# Patient Record
Sex: FEMALE | Race: BLACK OR AFRICAN AMERICAN | NOT HISPANIC OR LATINO | URBAN - METROPOLITAN AREA
[De-identification: names, ages, dates, MRNs, and addresses within clinical notes are randomized per-mention and may not be internally consistent; named-entity substitution may affect disease eponyms.]

---

## 2017-02-26 ENCOUNTER — EMERGENCY (EMERGENCY)
Facility: HOSPITAL | Age: 37
LOS: 1 days | Discharge: HOME | End: 2017-02-26
Attending: EMERGENCY MEDICINE | Admitting: EMERGENCY MEDICINE
Payer: SELF-PAY

## 2017-02-26 VITALS
SYSTOLIC BLOOD PRESSURE: 125 MMHG | OXYGEN SATURATION: 100 % | TEMPERATURE: 98 F | HEART RATE: 76 BPM | RESPIRATION RATE: 16 BRPM | DIASTOLIC BLOOD PRESSURE: 90 MMHG

## 2017-02-26 VITALS
RESPIRATION RATE: 16 BRPM | OXYGEN SATURATION: 99 % | HEART RATE: 86 BPM | SYSTOLIC BLOOD PRESSURE: 142 MMHG | DIASTOLIC BLOOD PRESSURE: 78 MMHG | TEMPERATURE: 98 F

## 2017-02-26 DIAGNOSIS — Z98.89 OTHER SPECIFIED POSTPROCEDURAL STATES: Chronic | ICD-10-CM

## 2017-02-26 PROCEDURE — 99283 EMERGENCY DEPT VISIT LOW MDM: CPT | Mod: 25

## 2017-02-26 PROCEDURE — 99053 MED SERV 10PM-8AM 24 HR FAC: CPT

## 2017-02-26 PROCEDURE — 64400 NJX AA&/STRD TRIGEMINAL NRV: CPT

## 2017-02-26 RX ORDER — AMOXICILLIN 250 MG/5ML
1 SUSPENSION, RECONSTITUTED, ORAL (ML) ORAL
Qty: 21 | Refills: 0
Start: 2017-02-26 | End: 2017-03-05

## 2017-02-26 RX ORDER — AMOXICILLIN 250 MG/5ML
500 SUSPENSION, RECONSTITUTED, ORAL (ML) ORAL ONCE
Qty: 0 | Refills: 0 | Status: COMPLETED | OUTPATIENT
Start: 2017-02-26 | End: 2017-02-26

## 2017-02-26 RX ORDER — IBUPROFEN 200 MG
1 TABLET ORAL
Qty: 21 | Refills: 0
Start: 2017-02-26 | End: 2017-03-05

## 2017-02-26 RX ORDER — IBUPROFEN 200 MG
600 TABLET ORAL ONCE
Qty: 0 | Refills: 0 | Status: COMPLETED | OUTPATIENT
Start: 2017-02-26 | End: 2017-02-26

## 2017-02-26 RX ORDER — OXYCODONE HYDROCHLORIDE 5 MG/1
1 TABLET ORAL
Qty: 6 | Refills: 0
Start: 2017-02-26 | End: 2017-03-01

## 2017-02-26 RX ORDER — BUPIVACAINE HCL/PF 7.5 MG/ML
2 VIAL (ML) INJECTION ONCE
Qty: 0 | Refills: 0 | Status: COMPLETED | OUTPATIENT
Start: 2017-02-26 | End: 2017-02-26

## 2017-02-26 RX ADMIN — Medication 600 MILLIGRAM(S): at 05:28

## 2017-02-26 RX ADMIN — Medication 500 MILLIGRAM(S): at 05:28

## 2017-02-26 RX ADMIN — Medication 2 MILLILITER(S): at 05:58

## 2017-02-26 NOTE — ED ADULT TRIAGE NOTE - CHIEF COMPLAINT QUOTE
Pt st"For past 7 hours my rt upper tooth is hurting me...I think it is infected....I see swelling around gum." I took naproxsyn around 2am. " Pt appears very uncomfortable ...hold rt side of face. No swelling noted.

## 2017-02-26 NOTE — ED PROVIDER NOTE - ATTENDING CONTRIBUTION TO CARE
36F presents with dental pain.  Symptoms started this evening.  No specific trauma or inciting factor.  No associated swelling, no fever, no facial swelling.  No prior issues with that tooth.  On exam well appearing, nad, OP clear, R upper 2nd molar with fractured enamel, no gum swelling, no drainage,  mmm, lungs clear, rrr, abd soft, no rash, no edema, 2+ pulses.  Dental block applied.  Will d/c home on abx, pain control, close dental follow-up.

## 2017-02-26 NOTE — ED PROVIDER NOTE - OBJECTIVE STATEMENT
35yo F p/w pain to R upper molar x6 hrs.  Denies trauma, fever.  Last saw a dentist 9mo ago for unrelated reasons.  Took naproxen and tylenol w/o relief.

## 2017-02-26 NOTE — ED PROCEDURE NOTE - CPROC ED POST PROC CARE GUIDE1
Verbal/written post procedure instructions were given to patient/caregiver./Instructed patient/caregiver regarding signs and symptoms of infection./Avoid solid food./Instructed patient/caregiver to follow-up with primary dentist./Avoid hot food.

## 2017-02-26 NOTE — ED ADULT NURSE NOTE - OBJECTIVE STATEMENT
Patient arrived a&ox4, ambulatory, to ED room 22. Patient complaining of right tooth pain, unrelieved by home medications. MD evaluated patient, medicated as documented.

## 2017-12-18 ENCOUNTER — APPOINTMENT (OUTPATIENT)
Dept: SURGICAL ONCOLOGY | Facility: CLINIC | Age: 37
End: 2017-12-18

## 2018-01-16 ENCOUNTER — RESULT REVIEW (OUTPATIENT)
Age: 38
End: 2018-01-16

## 2018-05-07 ENCOUNTER — APPOINTMENT (OUTPATIENT)
Dept: SURGICAL ONCOLOGY | Facility: CLINIC | Age: 38
End: 2018-05-07

## 2018-08-09 ENCOUNTER — EMERGENCY (EMERGENCY)
Facility: HOSPITAL | Age: 38
LOS: 1 days | Discharge: HOME | End: 2018-08-09
Attending: EMERGENCY MEDICINE | Admitting: EMERGENCY MEDICINE
Payer: MEDICAID

## 2018-08-09 VITALS
OXYGEN SATURATION: 98 % | DIASTOLIC BLOOD PRESSURE: 71 MMHG | SYSTOLIC BLOOD PRESSURE: 120 MMHG | TEMPERATURE: 98 F | HEART RATE: 90 BPM | RESPIRATION RATE: 18 BRPM

## 2018-08-09 DIAGNOSIS — Z98.89 OTHER SPECIFIED POSTPROCEDURAL STATES: Chronic | ICD-10-CM

## 2018-08-09 PROCEDURE — 99283 EMERGENCY DEPT VISIT LOW MDM: CPT

## 2018-08-09 RX ORDER — TETANUS TOXOID, REDUCED DIPHTHERIA TOXOID AND ACELLULAR PERTUSSIS VACCINE, ADSORBED 5; 2.5; 8; 8; 2.5 [IU]/.5ML; [IU]/.5ML; UG/.5ML; UG/.5ML; UG/.5ML
0.5 SUSPENSION INTRAMUSCULAR ONCE
Qty: 0 | Refills: 0 | Status: COMPLETED | OUTPATIENT
Start: 2018-08-09 | End: 2018-08-09

## 2018-08-09 RX ORDER — CEPHALEXIN 500 MG
500 CAPSULE ORAL ONCE
Qty: 0 | Refills: 0 | Status: COMPLETED | OUTPATIENT
Start: 2018-08-09 | End: 2018-08-09

## 2018-08-09 RX ORDER — CEPHALEXIN 500 MG
1 CAPSULE ORAL
Qty: 14 | Refills: 0
Start: 2018-08-09 | End: 2018-08-15

## 2018-08-09 RX ADMIN — TETANUS TOXOID, REDUCED DIPHTHERIA TOXOID AND ACELLULAR PERTUSSIS VACCINE, ADSORBED 0.5 MILLILITER(S): 5; 2.5; 8; 8; 2.5 SUSPENSION INTRAMUSCULAR at 18:08

## 2018-08-09 RX ADMIN — Medication 500 MILLIGRAM(S): at 18:08

## 2018-08-09 NOTE — ED PROVIDER NOTE - SKIN WOUND DESCRIPTION
Superficial 1.5cm abrasion over left upper extermity between the antecubital fossa and the axilla. Mild surrounding erythema with TTP. Not warm. Not overlying the joint surface

## 2018-08-09 NOTE — ED PROVIDER NOTE - PROGRESS NOTE DETAILS
JOSH HORNER: Received signout and discussed plan with QUID attending. Stable for d/c home with followup. Pt amenable with plan.

## 2018-08-09 NOTE — ED PROVIDER NOTE - CARE PLAN
Principal Discharge DX:	Cellulitis of left upper extremity Principal Discharge DX:	Cellulitis of left upper extremity  Assessment and plan of treatment:	Follow up with your PMD within 48-72hours. Rest and elevate affected area. Take Keflex 500mg twice a day for seven days. Take Motrin 600mg every 8 hours with food for pain. Any worsening redness, swelling, streaking (red lines), fever, chills return to ER

## 2018-08-09 NOTE — ED PROVIDER NOTE - OBJECTIVE STATEMENT
38y F with PMHx scoliosis and rheumatoid arthritis presents to the ED for bug bite to left arm since Monday morning. Pt woke up with bug bite and thinks it might be from spider. Does endorse a headache earlier today. Reports bug bite is painful today. Does not know last tetanus shot. No fever. No travel to foreign country. NKDA. 38y F with PMHx scoliosis and rheumatoid arthritis ( no immunosuppression) presents to the ED for a suspected bug bite to left arm that she first noticed on Monday morning. No dizziness but has had intermittent headache which has now resolved. Today the wound became painful prompting her visit to the ED. Unknown last tetanus shot. No fever. No recent travel, sick contacts, or other family members with similar lesions. NKDA.

## 2019-02-14 NOTE — ED PROVIDER NOTE - PLAN OF CARE
Pt placed on nonrebreather by verbal order of dr  [facility]        Maria T Tilley, RAFAEL  02/14/19 [id number] Follow up with your PMD within 48-72hours. Rest and elevate affected area. Take Keflex 500mg twice a day for seven days. Take Motrin 600mg every 8 hours with food for pain. Any worsening redness, swelling, streaking (red lines), fever, chills return to ER

## 2020-02-16 ENCOUNTER — EMERGENCY (EMERGENCY)
Facility: HOSPITAL | Age: 40
LOS: 0 days | Discharge: HOME | End: 2020-02-16
Payer: SELF-PAY

## 2020-02-16 VITALS
OXYGEN SATURATION: 100 % | SYSTOLIC BLOOD PRESSURE: 124 MMHG | WEIGHT: 136.03 LBS | HEART RATE: 99 BPM | DIASTOLIC BLOOD PRESSURE: 85 MMHG | RESPIRATION RATE: 19 BRPM | TEMPERATURE: 98 F | HEIGHT: 59 IN

## 2020-02-16 DIAGNOSIS — S16.1XXA STRAIN OF MUSCLE, FASCIA AND TENDON AT NECK LEVEL, INITIAL ENCOUNTER: ICD-10-CM

## 2020-02-16 DIAGNOSIS — R11.0 NAUSEA: ICD-10-CM

## 2020-02-16 DIAGNOSIS — M06.9 RHEUMATOID ARTHRITIS, UNSPECIFIED: ICD-10-CM
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
## 2020-02-16 DIAGNOSIS — R51 HEADACHE: ICD-10-CM

## 2020-02-16 DIAGNOSIS — M54.2 CERVICALGIA: ICD-10-CM
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
## 2020-02-16 DIAGNOSIS — M41.9 SCOLIOSIS, UNSPECIFIED: ICD-10-CM

## 2020-02-16 DIAGNOSIS — Z98.89 OTHER SPECIFIED POSTPROCEDURAL STATES: Chronic | ICD-10-CM

## 2020-02-16 PROCEDURE — 99283 EMERGENCY DEPT VISIT LOW MDM: CPT

## 2020-02-16 RX ORDER — IBUPROFEN 200 MG
600 TABLET ORAL ONCE
Refills: 0 | Status: COMPLETED | OUTPATIENT
Start: 2020-02-16 | End: 2020-02-16

## 2020-02-16 RX ORDER — IBUPROFEN 200 MG
1 TABLET ORAL
Qty: 21 | Refills: 0
Start: 2020-02-16 | End: 2020-02-22

## 2020-02-16 RX ORDER — CYCLOBENZAPRINE HYDROCHLORIDE 10 MG/1
1 TABLET, FILM COATED ORAL
Qty: 21 | Refills: 0
Start: 2020-02-16 | End: 2020-02-22

## 2020-02-16 RX ADMIN — Medication 600 MILLIGRAM(S): at 17:09

## 2020-02-16 NOTE — ED ADULT NURSE NOTE - OBJECTIVE STATEMENT
pt  A&Ox4 with c/o of right neck pain since Friday. pt states she feels a lump on the back of her neck. no lumps felt on assessment but tenderness noted during palpation. no visible mass or lesions noted. also with c/o of nausea  and headache. Denies injury and trauma. self medicated with Tylenol and got relief about 1 hour ago. PMH RA  scoliosis.

## 2020-02-16 NOTE — ED ADULT TRIAGE NOTE - CHIEF COMPLAINT QUOTE
patient c/o of R side of the neck pain started on Wednesday , patient c/o of R side of the neck pain started on Wednesday , denied headache patient c/o of R side of the neck pain started on Wednesday , denied headache, denied trauma

## 2020-02-16 NOTE — ED PROVIDER NOTE - MUSCULOSKELETAL, MLM
Spine appears normal, range of motion is not limited, right sided trapezius TTP no swelling or erythema. limited left lateral ROM of neck due to pain, sensation intact

## 2020-02-16 NOTE — ED PROVIDER NOTE - CLINICAL SUMMARY MEDICAL DECISION MAKING FREE TEXT BOX
11 y/o F with right sided muscle strain of neck muscle was seen today, pt in NAD, VS wnl she was dc home to f/u with PMD

## 2020-02-16 NOTE — ED PROVIDER NOTE - PATIENT PORTAL LINK FT
You can access the FollowMyHealth Patient Portal offered by [facility] by registering at the following website: [email or web address]. By joining [employer]’s FollowMyHealth portal, you will also be able to view your health information using other applications (apps) compatible with our system.

## 2020-02-16 NOTE — ED PROVIDER NOTE - OBJECTIVE STATEMENT
38 y/o F presents to ED c/o right sided neck pain x 5 days associated with headache, and nausea today denies fever, vomiting, trauma and other concerns.

## 2020-04-10 ENCOUNTER — EMERGENCY (EMERGENCY)
Facility: HOSPITAL | Age: 40
LOS: 1 days | Discharge: HOME | End: 2020-04-10
Payer: COMMERCIAL

## 2020-04-10 VITALS
HEART RATE: 92 BPM | RESPIRATION RATE: 18 BRPM | DIASTOLIC BLOOD PRESSURE: 90 MMHG | HEIGHT: 59 IN | SYSTOLIC BLOOD PRESSURE: 134 MMHG | OXYGEN SATURATION: 99 % | TEMPERATURE: 99 F | WEIGHT: 141.1 LBS

## 2020-04-10 DIAGNOSIS — Z98.89 OTHER SPECIFIED POSTPROCEDURAL STATES: Chronic | ICD-10-CM

## 2020-04-10 PROCEDURE — 99282 EMERGENCY DEPT VISIT SF MDM: CPT

## 2020-04-10 NOTE — ED PROVIDER NOTE - OBJECTIVE STATEMENT
40 yo female with PMH of Rheumatoid Arthritis presents  with complaint of  9 day history of SOB, cough, palpitations, HA and subjective fevers. 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

## 2020-04-10 NOTE — ED PROVIDER NOTE - CLINICAL SUMMARY MEDICAL DECISION MAKING FREE TEXT BOX
38 yo female with PMH of Rheumatoid Arthritis presents  with complaint of  9 day history of SOB, cough, palpitations, HA and subjective fevers. Patient exam WNL, ambulatory oxygen saturation 100%. Pt. advised to self quarantine x 14 days and to treat symptoms i.e. cough, HA fever with OTC medications.

## 2020-04-10 NOTE — ED PROVIDER NOTE - NSFOLLOWUPINSTRUCTIONS_ED_ALL_ED_FT
You received verbal instructions and did not sign because of the risk of infection, and expressed understanding of the discharge instructions. Please followup with your doctor (call) and consider follow up in his/her office. Please practice good hand hygiene and social distancing. If fever, cough, shortness of breath, or any worse symptoms return to the ER.  If you have symptoms, consideration to quarantine yourself for 14 days from start of symptoms should be considered.  You can call [phone] for any Covid related questions or your local department of health. ([facility] Dept of Health [phone], or [facility]).

## 2020-04-10 NOTE — ED PROVIDER NOTE - PLAN OF CARE
40 yo female with PMH of Rheumatoid Arthritis presents  with complaint of  9 day history of SOB, cough, palpitations, HA and subjective fevers. Patient exam WNL, ambulatory oxygen saturation 100%. Pt. advised to self quarantine x 14 days and to treat symptoms i.e. cough, HA fever with OTC medications.

## 2020-04-10 NOTE — ED PROVIDER NOTE - PATIENT PORTAL LINK FT
You can access the FollowMyHealth Patient Portal offered by [facility] by registering at the following website: [email or web address]. By joining [employer]’s FollowMyHealth portal, you will also be able to view your health information using other applications (apps) compatible with our system.

## 2020-04-10 NOTE — ED PROVIDER NOTE - CHIEF COMPLAINT
The patient is a 39y Female complaining of
The patient is a [AgeY] [Gender] complaining of [CCCP trg chief cmplnt].

## 2020-04-10 NOTE — ED PROVIDER NOTE - CARE PLAN
Principal Discharge DX:	Viral syndrome  Assessment and plan of treatment:	38 yo female with PMH of Rheumatoid Arthritis presents  with complaint of  9 day history of SOB, cough, palpitations, HA and subjective fevers. Patient exam WNL, ambulatory oxygen saturation 100%. Pt. advised to self quarantine x 14 days and to treat symptoms i.e. cough, HA fever with OTC medications.

## 2020-04-12 ENCOUNTER — TRANSCRIPTION ENCOUNTER (OUTPATIENT)
Age: 40
End: 2020-04-12

## 2020-04-12 ENCOUNTER — EMERGENCY (EMERGENCY)
Facility: HOSPITAL | Age: 40
LOS: 1 days | Discharge: HOME | End: 2020-04-12
Attending: STUDENT IN AN ORGANIZED HEALTH CARE EDUCATION/TRAINING PROGRAM | Admitting: EMERGENCY MEDICINE
Payer: COMMERCIAL

## 2020-04-12 VITALS
HEART RATE: 85 BPM | DIASTOLIC BLOOD PRESSURE: 91 MMHG | OXYGEN SATURATION: 100 % | RESPIRATION RATE: 16 BRPM | SYSTOLIC BLOOD PRESSURE: 122 MMHG | TEMPERATURE: 98 F

## 2020-04-12 DIAGNOSIS — Z98.89 OTHER SPECIFIED POSTPROCEDURAL STATES: Chronic | ICD-10-CM

## 2020-04-12 PROCEDURE — 93010 ELECTROCARDIOGRAM REPORT: CPT

## 2020-04-12 PROCEDURE — 99283 EMERGENCY DEPT VISIT LOW MDM: CPT | Mod: 25

## 2020-04-12 PROCEDURE — 71045 X-RAY EXAM CHEST 1 VIEW: CPT | Mod: 26

## 2020-04-12 NOTE — ED ADULT NURSE NOTE - OBJECTIVE STATEMENT
patient complaining of shortness of breath worse when laying down. no issues when ambulating or moving about. patient also complaining of random episodes of palpitations

## 2020-04-12 NOTE — ED PROVIDER NOTE - PATIENT PORTAL LINK FT
You can access the FollowMyHealth Patient Portal offered by [facility] by registering at the following website: [email or web address]. By joining [employer]’s FollowMyHealth portal, you will also be able to view your health information using other applications (apps) compatible with our system.

## 2020-04-12 NOTE — ED ADULT TRIAGE NOTE - CHIEF COMPLAINT QUOTE
Pt presents with cough & SOB worsening with lying down improving with exertion since 3/31/2020, reports palpitations on and off since 4/1/2020. Seen by urgent care and ED. Currently taking amoxicillin for "redness in my throat." No PMH, NKDA. Not tested for COVID-19. Speaks in full and complete sentences

## 2020-04-12 NOTE — ED PROVIDER NOTE - NSFOLLOWUPINSTRUCTIONS_ED_ALL_ED_FT
-- Please avoid contact with others while you have symptoms and ideally for two weeks. You can stop isolation  once you have not had fever for 3 days and no symptoms for 7 days.  --Please sleep in a room separate from others who are sick.   -- This is likely a virus.  There is no direct treatment for a virus, and antibiotics are not effective.     -- Rest, increase your fluid intake and avoid dehydration & alcohol.   -- It is very important to be diligent about hand washing & hygiene to avoid spreading the illness to others.  -- If you are having any worsening or continued fever, chills, weakness, nausea, vomiting, worsening shortness of breath, please see your doctor or return to the Emergency Department.  -- Please continue taking your home medications as directed.   -- For more information you can call the [place] Department of Health Coronavirus Hotline at [phone]. They  will have more information about public Coronavirus testing as it becomes available.    Why didn’t I get tested for novel coronavirus (COVID-19)?    The number of available tests is very limited so strict rules exist for who is allowed to be tested. [facility] has been authorized to perform testing and is currently working hard to be able to start providing the test. Such testing is currently reserved for patients are very sick and requiring admission to the hospital.    What should I do now?    If you are well enough to be discharged home and are not in a high risk group to have contracted the COVID-19, you should care for yourself at home exactly like you would if you have Influenza “flu”. Follow all the standard guidelines about washing your hands, covering your cough, etc. You should return to the Emergency Department if you develop worse symptoms, trouble breathing, chest pain, and/or a fever that doesn’t improve with over the counter acetaminophen.

## 2020-04-12 NOTE — ED PROVIDER NOTE - OBJECTIVE STATEMENT
38yo F, h/o RA and scoliosis presents with shortness of breath ongoing for several weeks, worsened overnight. Aggravated by lying down and improved with ambulation. She also reports intermittent palpitations, but denies chest pain, dizziness, lightheadedness or headache. She denies fever or chills, N/V, or diarrhea. Her tenant is +COVID however has been quarantined. No recent travel. She was evaluated in ER 2 days ago and discharged with COVID precautions. Additionally, she was evaluated in urgent care on 4/7/2020 and was prescribed Amoxicillin for laryngitis. 38yo F, h/o RA and scoliosis presents with shortness of breath ongoing for several weeks, worsened overnight. Aggravated by lying down and improved with ambulation. She also reports intermittent palpitations, but denies chest pain, dizziness, lightheadedness or headache. She denies fever or chills, N/V, or diarrhea. Her tenant is +COVID however has been quarantined. No recent travel. She was evaluated in ER 2 days ago and discharged with COVID precautions. Additionally, she was evaluated in urgent care on 4/7/2020 and was prescribed Amoxicillin for laryngitis. She is not taking anything for RA. Denies recent steroid use.

## 2020-04-12 NOTE — ED PROVIDER NOTE - CLINICAL SUMMARY MEDICAL DECISION MAKING FREE TEXT BOX
40yo F with h/o RA and scoliosis presents with worsening shortness of breath, possible viral pneumonia vs. URI. Pt is afebrile, normotensive and no tachycardia, saturating well on ambulation to 97%. Examination normal. Will evaluate with CXR. Dispo pending results. 40yo F with h/o RA and scoliosis presents with worsening shortness of breath, possible viral pneumonia vs. URI. Pt is afebrile, normotensive and no tachycardia, saturating well on ambulation to 97%. Examination normal. CXR without evidence of pneumonia. Counseled patient on quarantine and hygiene precautions.

## 2020-04-13 ENCOUNTER — APPOINTMENT (OUTPATIENT)
Dept: DISASTER EMERGENCY | Facility: CLINIC | Age: 40
End: 2020-04-13

## 2020-04-14 ENCOUNTER — EMERGENCY (EMERGENCY)
Facility: HOSPITAL | Age: 40
LOS: 1 days | Discharge: HOME | End: 2020-04-14
Attending: STUDENT IN AN ORGANIZED HEALTH CARE EDUCATION/TRAINING PROGRAM | Admitting: STUDENT IN AN ORGANIZED HEALTH CARE EDUCATION/TRAINING PROGRAM
Payer: COMMERCIAL

## 2020-04-14 VITALS
OXYGEN SATURATION: 100 % | RESPIRATION RATE: 16 BRPM | HEIGHT: 59 IN | SYSTOLIC BLOOD PRESSURE: 111 MMHG | TEMPERATURE: 99 F | DIASTOLIC BLOOD PRESSURE: 76 MMHG | HEART RATE: 82 BPM

## 2020-04-14 DIAGNOSIS — Z98.89 OTHER SPECIFIED POSTPROCEDURAL STATES: Chronic | ICD-10-CM

## 2020-04-14 PROCEDURE — 99284 EMERGENCY DEPT VISIT MOD MDM: CPT

## 2020-04-14 PROCEDURE — 71045 X-RAY EXAM CHEST 1 VIEW: CPT | Mod: 26

## 2020-04-14 RX ORDER — ACETAMINOPHEN 500 MG
975 TABLET ORAL ONCE
Refills: 0 | Status: COMPLETED | OUTPATIENT
Start: 2020-04-14 | End: 2020-04-14

## 2020-04-14 RX ADMIN — Medication 975 MILLIGRAM(S): at 14:50

## 2020-04-14 NOTE — ED ADULT TRIAGE NOTE - SPO2 (%)
Complex Repair And M Plasty Text: The defect edges were debeveled with a #15 scalpel blade.  The primary defect was closed partially with a complex linear closure.  Given the location of the remaining defect, shape of the defect and the proximity to free margins an M plasty was deemed most appropriate for complete closure of the defect.  Using a sterile surgical marker, an appropriate advancement flap was drawn incorporating the defect and placing the expected incisions within the relaxed skin tension lines where possible.    The area thus outlined was incised deep to adipose tissue with a #15 scalpel blade.  The skin margins were undermined to an appropriate distance in all directions utilizing iris scissors. 100

## 2020-04-14 NOTE — ED PROVIDER NOTE - CLINICAL SUMMARY MEDICAL DECISION MAKING FREE TEXT BOX
Tanja: adult female with multiple ED and urgent care visits since developing COVID-like symptoms two weeks ago. Patient is tearful and scared regarding pandemic. Chest pain is likely MSK in origin given no risk factors for ACS, PE, dissection, normal vital signs, normal EKG, no respiratory distress and overall well appearing patient. plan: cxr, 12 lead, tylenol.

## 2020-04-14 NOTE — ED PROVIDER NOTE - PATIENT PORTAL LINK FT
You can access the FollowMyHealth Patient Portal offered by [facility] by registering at the following website: [email or web address]. By joining [employer]’s FollowMyHealth portal, you will also be able to view your health information using other applications (apps) compatible with our system.

## 2020-04-14 NOTE — ED PROVIDER NOTE - PHYSICAL EXAMINATION
Physical Exam:    I have reviewed the triage vital signs.    Gen: NAD, AOx3, non-toxic appearing, able to ambulate without assistance  Head and Neck: NCAT, Neck supple without meningismus   HEENT: EOMI, PEERLA, normal conjunctiva, tongue midline, oral mucosa moist  Lung: CTAB, no respiratory distress, no wheezes/rhonchi/rales B/L, speaking in full sentences  CV: RRR, no murmurs, rubs or gallops  Abd: soft, NT, ND, no guarding, no rigidity, no rebound tenderness, no CVA tenderness, no masses.   MSK: no gross deformities, ROM normal in UE/LE, no back tenderness  Neuro: CNs II-XII grossly intact. No focal sensory or motor deficits  Skin: Warm, well perfused, no rash, no leg swelling  Psych: Appropriate mood and affect.

## 2020-04-14 NOTE — ED PROVIDER NOTE - OBJECTIVE STATEMENT
39F pmh RA not on meds presents with chest discomfort. Patient awoke from sleep at 230am with a pinching feeling in chest that has been intermittent this morning. She says she is has abnormal sensorium in left arm. Denies loc, trouble breathing, diaphoresis. Endorses palpitations since March. Patient has been sick since the end of March with gen malaise, cough, fever, diarrhea, loss appetite, headache. Has an appointment to get swabbed tomorrow. No ca. no recent sx or immobilization. does  not smoke ciggarettes. Patient currently does not have chest discomfort.

## 2020-04-14 NOTE — ED PROVIDER NOTE - CARE PLAN
Principal Discharge DX:	Suspected 2019 novel coronavirus infection  Secondary Diagnosis:	Costochondral chest pain

## 2020-04-14 NOTE — ED PROVIDER NOTE - ATTENDING CONTRIBUTION TO CARE
Hailey De La Torre M.D: 39F hx RA,  scoliosis,  p/ws pinching substernal cp since 230AM woke her from sleep. pain ahs been intermittent since then. no sob/diaphroesis/n/v. never had anything like before. has had covid symptoms since end of march (malasie, cough, fever, diarrhea) and positive sick contact, scheduled for covid swab tomorrow. pt appears anxious lungs ctab heart rrr abd soft ntnd no le edema. ekg NSR withuot sichemic changes. pt without cardaic rfs and has normal ekg thus low suspicion for ACS. pt is PERC negative so no concern for PE. will check cxr to assess for possible infectious process lobar pna vs viral pna (most likely) give tylenol for pain and reassess, dave frazier home with outpt follow up.

## 2020-04-14 NOTE — ED PROVIDER NOTE - NSFOLLOWUPINSTRUCTIONS_ED_ALL_ED_FT
Please take 600 mg of Ibuprofen (aka Motrin, Advil) and/or 650 mg Acetaminophen (aka Tylenol) every 6 hours, as needed, for mild-moderate pain.     YOU WERE SEEN IN THE ED FOR A LIKELY VIRAL ILLNESS. WE CANNOT PERFORM DEFINITIVE TESTING AT THIS TIME FOR THE NOVEL CORONAVIRUS, HOWEVER IT IS VERY LIKELY THAT YOU HAVE COVID-19 AND TESTING IS NOT NECESSARY AT THIS TIME. PLEASE READ THE INFORMATION PACKET PROVIDED TO YOU CAREFULLY.     YOU SHOULD SELF-QUARANTINE FOR 14 DAYS TO AVOID POTENTIAL SPREAD OF THE CORONAVIRUS.     PLEASE CONTACT YOUR REGULAR DOCTOR OR CALL  [phone] after your symptoms are gone for 7 days to see if you can be tested to be sure you no longer have the coronavirus infection and when you can return to normal activity.    Return to the ED for difficulty breathing especially when you are at rest as this is a sign of severe pneumonia from the virus.    You may over the counter acetaminophen (Tylenol) 650mg every 6 hours as needed for fever or pain.     Do NOT exceed 3500mg acetaminophen in 24 hours.  Please do not take these medications if you do not have pain or fever or if you have any history of liver disease.    -------------    What is a coronavirus?  Coronaviruses are a large family of viruses that cause illnesses ranging from the common cold to more severe diseases such as Middle East Respiratory Syndrome (MERS) and Severe Acute Respiratory Syndrome (SARS).    What is Novel Coronavirus (COVID-19)?  The Centers for Disease Control and Prevention (CDC) is closely monitoring the outbreak caused by COVID-19. For the latest information about COVID-19, visit the CDC website at CDC.gov/Coronavirus    How are coronaviruses spread?  Coronaviruses can be transmitted from person to person, usually after close contact with an infected  person (for example, in a household, workplace, or healthcare setting), via droplets that become airborne after a cough or sneeze. These droplets can then infect a nearby person. Transmission can also occur by touching recently contaminated surfaces.    Is there a treatment for a COVID-19?  There is no specific treatment for disease caused by COVID-19. However, many of the symptoms can be treated based on the patient’s clinical condition. Supportive care for infected persons can be highly effective.    What are the symptoms of coronavirus infection?  It depends on the virus, but common signs include fever and/or respiratory symptoms such as cough and shortness of breath. In more severe cases, infection can cause pneumonia, severe acute respiratory syndrome, kidney failure and even death. Fortunately, most cases of COVID-19 have an illness no different than the influenza (flu), with a majority of these patients having mild symptoms and overall mortality which appears to be not much different than the flu.    What can I do to protect myself?  The best precautionary measures:  – washing your hands  – covering your cough  – disinfecting surfaces  – it is also advisable to avoid close contact with anyone showing symptoms of respiratory illness such as coughing and sneezing  – those with symptoms should wear a surgical mask when around others    What can I do to protect those around me?  If you have been identified as someone who may be infected with COVID-19, we recommend you follow the self-isolation procedures outlined on the following page to protect those around you and to limit the spread of this virus.    We recommend the below precautionary steps from now until 14 days from when you returned from your travel or date of your last known possible contact:    — Do not go to work, school or public areas. Avoid using public transportation, ridesharing or taxis.  — As much as possible, separate yourself from other people in your home. If you can, you should stay in a room and away from other people. Also, you should use a separate bathroom if available.  — Wear the supplied mask whenever you are around other people.  — If you have a non-urgent medical appointment, please reschedule for a later date. If the appointment is urgent, please call the health care provider and tell them that you are on self-isolation for possible COVID-19. This will help the health care provider’s office take steps to keep other people from getting infected or exposed. If you can reschedule routine appointments, do so.  — Wash your hands often with soap and water for at least 15 to 20 seconds or clean your hands with an alcohol-based hand  that contains 60 to 95% alcohol, covering all surfaces of your hands and rubbing them together until they feel dry. Soap and water should be used preferentially if hands are visibly dirty.  — Cover your mouth and nose with a tissue when you cough or sneeze. Throw used tissues in a lined trash can. Immediately wash your hands.  — Avoid touching your eyes, nose, and mouth with your hands.  — Avoid sharing personal household items. You should not share dishes, drinking glasses, cups, eating utensils, towels, or bedding with other people or pets in your home. After using these items, they should be washed thoroughly with soap and water.  — Clean and disinfect all “high-touch” surfaces every day. High touch surfaces include counters, tabletops, doorknobs, light switches, remote controls, bathroom fixtures, toilets, phones, keyboards, tablets, and bedside tables. Also, clean any surfaces that may have blood, stool, or body fluids on them.

## 2020-04-14 NOTE — ED ADULT TRIAGE NOTE - CHIEF COMPLAINT QUOTE
patient c/o a pinches in her chest on the left side today, states her body gets hot but no fever. She is being tested for covid tomorrow at [facility].

## 2020-04-18 ENCOUNTER — EMERGENCY (EMERGENCY)
Facility: HOSPITAL | Age: 40
LOS: 1 days | Discharge: HOME | End: 2020-04-18
Admitting: EMERGENCY MEDICINE
Payer: COMMERCIAL

## 2020-04-18 VITALS
HEART RATE: 79 BPM | DIASTOLIC BLOOD PRESSURE: 88 MMHG | SYSTOLIC BLOOD PRESSURE: 124 MMHG | RESPIRATION RATE: 79 BRPM | HEIGHT: 59 IN | TEMPERATURE: 98 F | OXYGEN SATURATION: 98 %

## 2020-04-18 DIAGNOSIS — Z98.89 OTHER SPECIFIED POSTPROCEDURAL STATES: Chronic | ICD-10-CM

## 2020-04-18 PROCEDURE — 99283 EMERGENCY DEPT VISIT LOW MDM: CPT

## 2020-04-18 NOTE — ED PROVIDER NOTE - OBJECTIVE STATEMENT
40 y/o female hx RA (not on meds) presents to ER for "low temperature". Pt. has had multiple visits to ER for covid concerns and various URI symptoms - states she tested negative - but has still been concerned - states she has been taking her temperature every night and tonight she took it and it was 95.8 - took it agin and it was 97 but then it went down to 96 - states this concerned her and prompted her to come to ER. Also states felt lightheaded for  few seconds and hands felt cold but now feeling better. Pt. denies nausea vomit sob cp nt sweats hemoptysis.

## 2020-04-18 NOTE — ED ADULT TRIAGE NOTE - CHIEF COMPLAINT QUOTE
"around half hour ago I took my oral temperature it was 95.6. And I felt a little light headed" no chest pain or shortness of breath. no neuro deficits. "around half hour ago I took my oral temperature it was 95.6. And I felt a little light headed" no chest pain or shortness of breath. no neuro deficits. denies cough

## 2020-04-18 NOTE — ED PROVIDER NOTE - PATIENT PORTAL LINK FT
You can access the FollowMyHealth Patient Portal offered by [facility] by registering at the following website: [email or web address]. By joining [employer]’s FollowMyHealth portal, you will also be able to view your health information using other applications (apps) compatible with our system.

## 2020-04-18 NOTE — ED PROVIDER NOTE - CLINICAL SUMMARY MEDICAL DECISION MAKING FREE TEXT BOX
38 y/o female w/ multiple visits to ER for covid/viral concerns c/o "low temperature" at home today  -temperature in ER 98.2, patient hemodynamically stable and vitals WNL  -will send RVP to exclude other viral illnesses (patient states already covid negative)  -recommending PMD follow up

## 2020-04-19 LAB

## 2020-04-19 NOTE — ED POST DISCHARGE NOTE - REASON FOR FOLLOW-UP
Other influenza detected. Patient contact # [phone] patient contacted discussed with patient influenza and that patient should stay hydrated and can use TYl for any pains or fever. Discussed with patient need to return to ED if symptoms don't continue to improve or recur or develops any new or worsening symptoms that are of concern.

## 2020-04-24 ENCOUNTER — EMERGENCY (EMERGENCY)
Facility: HOSPITAL | Age: 40
LOS: 1 days | Discharge: HOME | End: 2020-04-24
Attending: EMERGENCY MEDICINE | Admitting: EMERGENCY MEDICINE
Payer: COMMERCIAL

## 2020-04-24 VITALS
RESPIRATION RATE: 18 BRPM | HEIGHT: 59 IN | SYSTOLIC BLOOD PRESSURE: 145 MMHG | TEMPERATURE: 98 F | DIASTOLIC BLOOD PRESSURE: 85 MMHG | HEART RATE: 104 BPM | OXYGEN SATURATION: 100 %

## 2020-04-24 DIAGNOSIS — Z98.89 OTHER SPECIFIED POSTPROCEDURAL STATES: Chronic | ICD-10-CM

## 2020-04-24 LAB
APPEARANCE UR: CLEAR — SIGNIFICANT CHANGE UP
BILIRUB UR-MCNC: NEGATIVE — SIGNIFICANT CHANGE UP
BLOOD UR QL VISUAL: NEGATIVE — SIGNIFICANT CHANGE UP
COLOR SPEC: COLORLESS — SIGNIFICANT CHANGE UP
GLUCOSE UR-MCNC: NEGATIVE — SIGNIFICANT CHANGE UP
KETONES UR-MCNC: NEGATIVE — SIGNIFICANT CHANGE UP
LEUKOCYTE ESTERASE UR-ACNC: NEGATIVE — SIGNIFICANT CHANGE UP
NITRITE UR-MCNC: NEGATIVE — SIGNIFICANT CHANGE UP
PH UR: 7 — SIGNIFICANT CHANGE UP (ref 5–8)
PROT UR-MCNC: NEGATIVE — SIGNIFICANT CHANGE UP
SP GR SPEC: 1 — LOW (ref 1–1.04)
UROBILINOGEN FLD QL: NORMAL — SIGNIFICANT CHANGE UP

## 2020-04-24 PROCEDURE — 99283 EMERGENCY DEPT VISIT LOW MDM: CPT

## 2020-04-24 NOTE — ED ADULT TRIAGE NOTE - CHIEF COMPLAINT QUOTE
Pt c/o diarrhea that started at 0730, denies abdominal pain, afebrile, h/o RA. Pt states she tested + flu this past sunday, but took a covid test last week and tested negative.

## 2020-04-24 NOTE — ED PROVIDER NOTE - OBJECTIVE STATEMENT
39 y/o F with PMHx of RA, Scoliosis and influenza A+ on 4/18 and OCVID-19 negative on 4/18 presenting to the ED c/o nonpainful runny and watery diarrhea since 7:30am this morning. Denies abdominal pain, urinary symptoms. Multiple recent visits to the hospital for URI like symptoms. Prior to today endorsing SOB and CP since end of March. However those symptoms mostly resolved now. Pt was endorsing 2 days of right flank pain resolving yesterday without dysuria and hematuria. Able to tolerate PO intake. Denies pregnancy. Today reports no SOB or CP. Denies cough, fever, chills, nausea, vomiting. No medication use currently. No hx of recurrent UTI. 39 y/o F with PMHx of RA, Scoliosis and influenza A+ on 4/18 and COVID-19 negative on 4/18 presenting to the ED c/o nonpainful runny and watery diarrhea since 7:30am this morning. Denies abdominal pain and urinary symptoms. Multiple recent visits to the hospital for URI like symptoms. Prior to today endorsing SOB and CP since end of March. However those symptoms mostly resolved now. Pt was endorsing 2 days of right flank pain resolving yesterday without dysuria and hematuria. Able to tolerate PO intake. Denies pregnancy. Today reports no SOB or CP. Denies cough, fever, chills, nausea, vomiting. No medication use currently. No hx of recurrent UTI.

## 2020-04-24 NOTE — ED ADULT NURSE NOTE - OBJECTIVE STATEMENT
Pt recently + for flu and - for COVID. Started having diarrhea this morning. Ambulating around unit without difficulty. Appears to be in no acute distress.

## 2020-04-24 NOTE — ED PROVIDER NOTE - CLINICAL SUMMARY MEDICAL DECISION MAKING FREE TEXT BOX
39 y/o F PMHx of RA and flu+ presenting with one day hx of diarrhea. Tolerating PO, vitals stable and afebrile. Will sent UA and urine culture to r/o UTI given recent flank pain since resolved. Otherwise no need for imaging or blood work at this time.

## 2020-04-24 NOTE — ED PROVIDER NOTE - PATIENT PORTAL LINK FT
You can access the FollowMyHealth Patient Portal offered by [facility] by registering at the following website: [email or web address]. By joining [employer]’s FollowMyHealth portal, you will also be able to view your health information using other applications (apps) compatible with our system.

## 2020-04-24 NOTE — ED PROVIDER NOTE - NSFOLLOWUPINSTRUCTIONS_ED_ALL_ED_FT
Please follow up with your primary care provider for further concerns you may have regarding your general health. Attached you will find your results from today's visit. Continue taking your medications as prescribed and keep your upcoming medical appointments.    Diarrhea    Diarrhea is frequent loose or watery bowel movements that has many causes. Diarrhea can make you feel weak and cause you to become dehydrated. Diarrhea typically lasts 2–3 days, but can last longer if it is a sign of something more serious. Drink clear fluids to prevent dehydration. Eat bland, easy-to-digest foods as tolerated.     SEEK IMMEDIATE MEDICAL CARE IF YOU HAVE ANY OF THE FOLLOWING SYMPTOMS: high fevers, lightheadedness/dizziness, chest pain, black or bloody stools, shortness of breath, severe abdominal or back pain, or any signs of dehydration.

## 2020-04-24 NOTE — ED PROVIDER NOTE - ATTENDING CONTRIBUTION TO CARE
Dr. Bella: 41 yo female with RA, scoliosis, recent diagnosis of influenza A and COVID19 negative, in ED with few episodes of nonbloody diarrhea this morning.  Has been seen in ED few times for URI symptoms recently, is feeling improvement now from the symptoms that prompted those visits.  No abdominal pain or urinary complaints.  Still able to eat.  On exam pt well appearing, in NAD, heart RRR, lungs CTAB, abd NTND, extremities without swelling, strength 5/5 in all extremities and skin without rash.

## 2020-04-25 LAB
CULTURE RESULTS: SIGNIFICANT CHANGE UP
SPECIMEN SOURCE: SIGNIFICANT CHANGE UP

## 2020-04-29 ENCOUNTER — EMERGENCY (EMERGENCY)
Facility: HOSPITAL | Age: 40
LOS: 0 days | Discharge: HOME | End: 2020-04-29
Attending: EMERGENCY MEDICINE
Payer: COMMERCIAL

## 2020-04-29 VITALS
DIASTOLIC BLOOD PRESSURE: 80 MMHG | TEMPERATURE: 98 F | OXYGEN SATURATION: 98 % | RESPIRATION RATE: 18 BRPM | SYSTOLIC BLOOD PRESSURE: 145 MMHG | HEART RATE: 98 BPM

## 2020-04-29 VITALS
WEIGHT: 147.93 LBS | HEIGHT: 59 IN | RESPIRATION RATE: 16 BRPM | TEMPERATURE: 99 F | OXYGEN SATURATION: 95 % | HEART RATE: 105 BPM | SYSTOLIC BLOOD PRESSURE: 154 MMHG | DIASTOLIC BLOOD PRESSURE: 77 MMHG

## 2020-04-29 DIAGNOSIS — Z98.89 OTHER SPECIFIED POSTPROCEDURAL STATES: Chronic | ICD-10-CM

## 2020-04-29 LAB
ALBUMIN SERPL ELPH-MCNC: 3.4 G/DL — SIGNIFICANT CHANGE UP (ref 3.3–5)
ALP SERPL-CCNC: 62 U/L — SIGNIFICANT CHANGE UP (ref 40–120)
ALT FLD-CCNC: 34 U/L — SIGNIFICANT CHANGE UP (ref 12–78)
ANION GAP SERPL CALC-SCNC: 5 MMOL/L — SIGNIFICANT CHANGE UP (ref 5–17)
APTT BLD: 30.3 SEC — SIGNIFICANT CHANGE UP (ref 28.5–37)
AST SERPL-CCNC: 18 U/L — SIGNIFICANT CHANGE UP (ref 15–37)
BASOPHILS # BLD AUTO: 0.04 K/UL — SIGNIFICANT CHANGE UP (ref 0–0.2)
BASOPHILS NFR BLD AUTO: 0.6 % — SIGNIFICANT CHANGE UP (ref 0–2)
BILIRUB SERPL-MCNC: 0.4 MG/DL — SIGNIFICANT CHANGE UP (ref 0.2–1.2)
BUN SERPL-MCNC: 7 MG/DL — SIGNIFICANT CHANGE UP (ref 7–23)
CALCIUM SERPL-MCNC: 8.6 MG/DL — SIGNIFICANT CHANGE UP (ref 8.5–10.1)
CHLORIDE SERPL-SCNC: 106 MMOL/L — SIGNIFICANT CHANGE UP (ref 96–108)
CO2 SERPL-SCNC: 27 MMOL/L — SIGNIFICANT CHANGE UP (ref 22–31)
CREAT SERPL-MCNC: 0.59 MG/DL — SIGNIFICANT CHANGE UP (ref 0.5–1.3)
D DIMER BLD IA.RAPID-MCNC: 171 NG/ML DDU — SIGNIFICANT CHANGE UP
EOSINOPHIL # BLD AUTO: 0.11 K/UL — SIGNIFICANT CHANGE UP (ref 0–0.5)
EOSINOPHIL NFR BLD AUTO: 1.7 % — SIGNIFICANT CHANGE UP (ref 0–6)
GLUCOSE SERPL-MCNC: 99 MG/DL — SIGNIFICANT CHANGE UP (ref 70–99)
HCG SERPL-ACNC: 1 MIU/ML — SIGNIFICANT CHANGE UP
HCT VFR BLD CALC: 34.3 % — LOW (ref 34.5–45)
HGB BLD-MCNC: 11.4 G/DL — LOW (ref 11.5–15.5)
IMM GRANULOCYTES NFR BLD AUTO: 0.2 % — SIGNIFICANT CHANGE UP (ref 0–1.5)
INR BLD: 1.1 RATIO — SIGNIFICANT CHANGE UP (ref 0.88–1.16)
LYMPHOCYTES # BLD AUTO: 1.7 K/UL — SIGNIFICANT CHANGE UP (ref 1–3.3)
LYMPHOCYTES # BLD AUTO: 25.7 % — SIGNIFICANT CHANGE UP (ref 13–44)
MCHC RBC-ENTMCNC: 29.8 PG — SIGNIFICANT CHANGE UP (ref 27–34)
MCHC RBC-ENTMCNC: 33.2 GM/DL — SIGNIFICANT CHANGE UP (ref 32–36)
MCV RBC AUTO: 89.8 FL — SIGNIFICANT CHANGE UP (ref 80–100)
MONOCYTES # BLD AUTO: 0.63 K/UL — SIGNIFICANT CHANGE UP (ref 0–0.9)
MONOCYTES NFR BLD AUTO: 9.5 % — SIGNIFICANT CHANGE UP (ref 2–14)
NEUTROPHILS # BLD AUTO: 4.13 K/UL — SIGNIFICANT CHANGE UP (ref 1.8–7.4)
NEUTROPHILS NFR BLD AUTO: 62.3 % — SIGNIFICANT CHANGE UP (ref 43–77)
NRBC # BLD: 0 /100 WBCS — SIGNIFICANT CHANGE UP (ref 0–0)
PLATELET # BLD AUTO: 290 K/UL — SIGNIFICANT CHANGE UP (ref 150–400)
POTASSIUM SERPL-MCNC: 3.4 MMOL/L — LOW (ref 3.5–5.3)
POTASSIUM SERPL-SCNC: 3.4 MMOL/L — LOW (ref 3.5–5.3)
PROT SERPL-MCNC: 7.7 GM/DL — SIGNIFICANT CHANGE UP (ref 6–8.3)
PROTHROM AB SERPL-ACNC: 12.4 SEC — SIGNIFICANT CHANGE UP (ref 10–12.9)
RBC # BLD: 3.82 M/UL — SIGNIFICANT CHANGE UP (ref 3.8–5.2)
RBC # FLD: 13.3 % — SIGNIFICANT CHANGE UP (ref 10.3–14.5)
SARS-COV-2 RNA SPEC QL NAA+PROBE: SIGNIFICANT CHANGE UP
SODIUM SERPL-SCNC: 138 MMOL/L — SIGNIFICANT CHANGE UP (ref 135–145)
TROPONIN I SERPL-MCNC: <.015 NG/ML — SIGNIFICANT CHANGE UP (ref 0.01–0.04)
WBC # BLD: 6.62 K/UL — SIGNIFICANT CHANGE UP (ref 3.8–10.5)
WBC # FLD AUTO: 6.62 K/UL — SIGNIFICANT CHANGE UP (ref 3.8–10.5)

## 2020-04-29 PROCEDURE — 99285 EMERGENCY DEPT VISIT HI MDM: CPT

## 2020-04-29 PROCEDURE — 71045 X-RAY EXAM CHEST 1 VIEW: CPT | Mod: 26

## 2020-04-29 PROCEDURE — 93010 ELECTROCARDIOGRAM REPORT: CPT

## 2020-04-29 RX ORDER — AMOXICILLIN 250 MG/5ML
1 SUSPENSION, RECONSTITUTED, ORAL (ML) ORAL
Qty: 0 | Refills: 0 | DISCHARGE

## 2020-04-29 NOTE — ED PROVIDER NOTE - CLINICAL SUMMARY MEDICAL DECISION MAKING FREE TEXT BOX
Pt w URI sx, concern for COVID.  VSS in NAD.  CXR neg for PNA.  Discussed quarantine and return precautions.  Discussed results and outcome of testing with the patient, given copy as well.  Patient advised to please follow up with their primary care doctor within the next 24 hours and return to the Emergency Department for worsening symptoms or any other concerns.  Patient advised that their doctor may call  to follow up on the specific results of the tests performed today in the emergency department.

## 2020-04-29 NOTE — ED PROVIDER NOTE - PATIENT PORTAL LINK FT
Verified Results  CBC WITH AUTOMATED DIFFERENTIAL 19Jul2017 01:14PM VIKI HOFFMAN     Test Name Result Flag Reference   WHITE BLOOD COUNT 6.0 K/mcL  4.2-11.0   RED CELL COUNT 4.05 mil/mcL  4.00-5.20   HEMOGLOBIN 13.6 g/dl  12.0-15.5   HEMATOCRIT 38.8 %  36.0-46.5   MEAN CORPUSCULAR VOLUME 95.8 fL  78.0-100.0   MEAN CORPUSCULAR HEMOGLOBIN 33.6 pg  26.0-34.0   MEAN CORPUSCULAR HGB CONC 35.1 g/dl  32.0-36.5   RDW-CV 13.2 %  11.0-15.0   PLATELET COUNT 275 K/mcL  140-450   DIFF TYPE      AUTOMATED DIFFERENTIAL   PANCHITO% 67 %     LYM% 23 %     MON% 5 %     EOS% 4 %     BASO% 1 %     PANCHITO ABS 4.0 K/mcL  1.8-7.7   LYM ABS 1.4 K/mcL  1.0-4.8   MON ABS 0.3 K/mcL  0.3-0.9   EOS ABS 0.2 K/mcL  0.1-0.5   BASO ABS 0.0 K/mcL  0.0-0.3     SGOT/AST 19Jul2017 01:14PM YASMIN VIKI     Test Name Result Flag Reference   GOT/AST 44 Units/L H <38     SGPT/ALT 19Jul2017 01:14PM YASMIN VIKI     Test Name Result Flag Reference   GPT/ALT 63 Units/L  <79       
You can access the FollowMyHealth Patient Portal offered by [facility] by registering at the following website: [email or web address]. By joining [employer]’s FollowMyHealth portal, you will also be able to view your health information using other applications (apps) compatible with our system.

## 2020-04-29 NOTE — ED PROVIDER NOTE - OBJECTIVE STATEMENT
Pertinent PMH/PSH/FHx/SHx and Review of Systems contained within:    41yo F w PMH of RA, Scoliosis and influenza A+ on 4/18 and COVID-19 negative on 4/18 Pertinent PMH/PSH/FHx/SHx and Review of Systems contained within:    39yo F w PMH of RA (not on medications), Scoliosis, influenza A+ on 4/18 and COVID-19 negative on 4/18 Pertinent PMH/PSH/FHx/SHx and Review of Systems contained within:    39yo F w PMH of RA (not on medications), Scoliosis presents to ED for eval of SOB.  Pt states she noted URI sx about 2wks ago w fever, had outpt COVID swab done, results negative, then tested +influenza A about 11d ago.  Pt states since then fever subsided, but she continued to have palpitations.  4d ago pt noted loose stools, seen at [facility], reports labs & work up neg.  Pt states she then noted sore throat, went to , dx w pharyngitis.  The next day pt noted cough, productive of clear sputum.  Tonight while sleeping pt had episode when she felt chest tightness & spasm, which woke her up.  Pt denies known sick contacts, states  at home has not been ill.    No fever/chills, No photophobia/eye pain/changes in vision, No ear pain/sore throat/dysphagia, +palpitations, +SOB/cough, No abdominal pain, No neck/back pain, no rash, no changes in neurological status/function.

## 2020-04-29 NOTE — ED PROVIDER NOTE - NSFOLLOWUPINSTRUCTIONS_ED_ALL_ED_FT
please follow up with your primary care doctor    return to hospital if your oxygen level is 89% or less

## 2020-04-30 DIAGNOSIS — R06.02 SHORTNESS OF BREATH: ICD-10-CM

## 2020-04-30 DIAGNOSIS — M06.9 RHEUMATOID ARTHRITIS, UNSPECIFIED: ICD-10-CM

## 2020-04-30 DIAGNOSIS — R00.2 PALPITATIONS: ICD-10-CM

## 2020-04-30 DIAGNOSIS — R50.9 FEVER, UNSPECIFIED: ICD-10-CM

## 2020-05-02 ENCOUNTER — EMERGENCY (EMERGENCY)
Facility: HOSPITAL | Age: 40
LOS: 0 days | Discharge: HOME | End: 2020-05-02
Attending: EMERGENCY MEDICINE
Payer: COMMERCIAL

## 2020-05-02 VITALS
SYSTOLIC BLOOD PRESSURE: 142 MMHG | DIASTOLIC BLOOD PRESSURE: 97 MMHG | OXYGEN SATURATION: 98 % | TEMPERATURE: 98 F | HEART RATE: 100 BPM | RESPIRATION RATE: 16 BRPM | HEIGHT: 59 IN | WEIGHT: 141.1 LBS

## 2020-05-02 VITALS
RESPIRATION RATE: 19 BRPM | HEART RATE: 82 BPM | OXYGEN SATURATION: 99 % | TEMPERATURE: 98 F | SYSTOLIC BLOOD PRESSURE: 139 MMHG | DIASTOLIC BLOOD PRESSURE: 84 MMHG

## 2020-05-02 DIAGNOSIS — R05 COUGH: ICD-10-CM

## 2020-05-02 DIAGNOSIS — M06.9 RHEUMATOID ARTHRITIS, UNSPECIFIED: ICD-10-CM

## 2020-05-02 DIAGNOSIS — R06.02 SHORTNESS OF BREATH: ICD-10-CM

## 2020-05-02 DIAGNOSIS — M41.9 SCOLIOSIS, UNSPECIFIED: ICD-10-CM

## 2020-05-02 DIAGNOSIS — Z98.89 OTHER SPECIFIED POSTPROCEDURAL STATES: Chronic | ICD-10-CM

## 2020-05-02 DIAGNOSIS — R42 DIZZINESS AND GIDDINESS: ICD-10-CM

## 2020-05-02 LAB
ALBUMIN SERPL ELPH-MCNC: 3.5 G/DL — SIGNIFICANT CHANGE UP (ref 3.3–5)
ALP SERPL-CCNC: 63 U/L — SIGNIFICANT CHANGE UP (ref 40–120)
ALT FLD-CCNC: 21 U/L — SIGNIFICANT CHANGE UP (ref 12–78)
ANION GAP SERPL CALC-SCNC: 4 MMOL/L — LOW (ref 5–17)
APTT BLD: 35.9 SEC — SIGNIFICANT CHANGE UP (ref 28.5–37)
AST SERPL-CCNC: 19 U/L — SIGNIFICANT CHANGE UP (ref 15–37)
BASOPHILS # BLD AUTO: 0.04 K/UL — SIGNIFICANT CHANGE UP (ref 0–0.2)
BASOPHILS NFR BLD AUTO: 0.8 % — SIGNIFICANT CHANGE UP (ref 0–2)
BILIRUB SERPL-MCNC: 0.3 MG/DL — SIGNIFICANT CHANGE UP (ref 0.2–1.2)
BUN SERPL-MCNC: 5 MG/DL — LOW (ref 7–23)
CALCIUM SERPL-MCNC: 8.8 MG/DL — SIGNIFICANT CHANGE UP (ref 8.5–10.1)
CHLORIDE SERPL-SCNC: 107 MMOL/L — SIGNIFICANT CHANGE UP (ref 96–108)
CO2 SERPL-SCNC: 31 MMOL/L — SIGNIFICANT CHANGE UP (ref 22–31)
CREAT SERPL-MCNC: 0.65 MG/DL — SIGNIFICANT CHANGE UP (ref 0.5–1.3)
D DIMER BLD IA.RAPID-MCNC: <150 NG/ML DDU — SIGNIFICANT CHANGE UP
EOSINOPHIL # BLD AUTO: 0.09 K/UL — SIGNIFICANT CHANGE UP (ref 0–0.5)
EOSINOPHIL NFR BLD AUTO: 1.8 % — SIGNIFICANT CHANGE UP (ref 0–6)
GLUCOSE SERPL-MCNC: 92 MG/DL — SIGNIFICANT CHANGE UP (ref 70–99)
HCG SERPL-ACNC: 1 MIU/ML — SIGNIFICANT CHANGE UP
HCT VFR BLD CALC: 35.7 % — SIGNIFICANT CHANGE UP (ref 34.5–45)
HGB BLD-MCNC: 11.9 G/DL — SIGNIFICANT CHANGE UP (ref 11.5–15.5)
IMM GRANULOCYTES NFR BLD AUTO: 0.2 % — SIGNIFICANT CHANGE UP (ref 0–1.5)
INR BLD: 1.51 RATIO — HIGH (ref 0.88–1.16)
LYMPHOCYTES # BLD AUTO: 1.1 K/UL — SIGNIFICANT CHANGE UP (ref 1–3.3)
LYMPHOCYTES # BLD AUTO: 22.1 % — SIGNIFICANT CHANGE UP (ref 13–44)
MCHC RBC-ENTMCNC: 29.9 PG — SIGNIFICANT CHANGE UP (ref 27–34)
MCHC RBC-ENTMCNC: 33.3 GM/DL — SIGNIFICANT CHANGE UP (ref 32–36)
MCV RBC AUTO: 89.7 FL — SIGNIFICANT CHANGE UP (ref 80–100)
MONOCYTES # BLD AUTO: 0.43 K/UL — SIGNIFICANT CHANGE UP (ref 0–0.9)
MONOCYTES NFR BLD AUTO: 8.7 % — SIGNIFICANT CHANGE UP (ref 2–14)
NEUTROPHILS # BLD AUTO: 3.3 K/UL — SIGNIFICANT CHANGE UP (ref 1.8–7.4)
NEUTROPHILS NFR BLD AUTO: 66.4 % — SIGNIFICANT CHANGE UP (ref 43–77)
NRBC # BLD: 0 /100 WBCS — SIGNIFICANT CHANGE UP (ref 0–0)
NT-PROBNP SERPL-SCNC: 11 PG/ML — SIGNIFICANT CHANGE UP (ref 0–125)
PLATELET # BLD AUTO: 275 K/UL — SIGNIFICANT CHANGE UP (ref 150–400)
POTASSIUM SERPL-MCNC: 3.9 MMOL/L — SIGNIFICANT CHANGE UP (ref 3.5–5.3)
POTASSIUM SERPL-SCNC: 3.9 MMOL/L — SIGNIFICANT CHANGE UP (ref 3.5–5.3)
PROT SERPL-MCNC: 8 GM/DL — SIGNIFICANT CHANGE UP (ref 6–8.3)
PROTHROM AB SERPL-ACNC: 17.1 SEC — HIGH (ref 10–12.9)
RBC # BLD: 3.98 M/UL — SIGNIFICANT CHANGE UP (ref 3.8–5.2)
RBC # FLD: 13.3 % — SIGNIFICANT CHANGE UP (ref 10.3–14.5)
SODIUM SERPL-SCNC: 142 MMOL/L — SIGNIFICANT CHANGE UP (ref 135–145)
TROPONIN I SERPL-MCNC: <.015 NG/ML — SIGNIFICANT CHANGE UP (ref 0.01–0.04)
WBC # BLD: 4.97 K/UL — SIGNIFICANT CHANGE UP (ref 3.8–10.5)
WBC # FLD AUTO: 4.97 K/UL — SIGNIFICANT CHANGE UP (ref 3.8–10.5)

## 2020-05-02 PROCEDURE — 71275 CT ANGIOGRAPHY CHEST: CPT | Mod: 26

## 2020-05-02 PROCEDURE — 99285 EMERGENCY DEPT VISIT HI MDM: CPT

## 2020-05-02 PROCEDURE — 93010 ELECTROCARDIOGRAM REPORT: CPT

## 2020-05-02 PROCEDURE — 70450 CT HEAD/BRAIN W/O DYE: CPT | Mod: 26

## 2020-05-02 RX ORDER — ONDANSETRON 8 MG/1
8 TABLET, FILM COATED ORAL ONCE
Refills: 0 | Status: COMPLETED | OUTPATIENT
Start: 2020-05-02 | End: 2020-05-02

## 2020-05-02 RX ADMIN — ONDANSETRON 8 MILLIGRAM(S): 8 TABLET, FILM COATED ORAL at 12:27

## 2020-05-02 NOTE — ED PROVIDER NOTE - PATIENT PORTAL LINK FT
You can access the FollowMyHealth Patient Portal offered by [facility] by registering at the following website: [email or web address]. By joining [employer]’s FollowMyHealth portal, you will also be able to view your health information using other applications (apps) compatible with our system.

## 2020-05-02 NOTE — ED ADULT TRIAGE NOTE - CHIEF COMPLAINT QUOTE
pt states she was diagnosed with Pulmonary embolus on Thursday and patient was taking a car ride back from [place] now having dizziness.

## 2020-05-02 NOTE — ED PROVIDER NOTE - PROGRESS NOTE DETAILS
Pt has been alert and oriented x 3 sts she is feeling much better now. Pt is ambulating with normal gaits without dizziness, sob, chest pain, nausea, vomiting, abd pain. Pt ct head normal ct angio chest no pulmonary embolism. Pt is advised continues take Eliquis and follow up with her doctor and return if symptoms persist or worsen.

## 2020-05-02 NOTE — ED PROVIDER NOTE - OBJECTIVE STATEMENT
40 years old female walked in c/o dizziness palpitations cough, sob. Pt had uri sx diagnosed flu A couple weeks ago Pt also sts she was diagnosed with pulmonary embolism and takes Eliquis 10 mg daily. Pt was here treated and discharged three days ago with covid negative. Pt denies headache, blurred visions, light sensitivities, focal/distal weakness or numbness, chest pain, nausea, vomiting, fever, chills, abd pain, dysuria, vaginal spotting or discharge or irregular bowel movements.

## 2020-05-02 NOTE — ED ADULT NURSE NOTE - CHIEF COMPLAINT QUOTE
pt states she was diagnosed with Pulmonary embolus on Thursday and patient was taking a car ride back from [place] now having dizziness.

## 2020-05-02 NOTE — ED PROVIDER NOTE - CONSTITUTIONAL, MLM
normal... Well appearing, awake, alert, oriented to person, place, time/situation and in no apparent distress. Speaking in clear full sentences no nasal flaring no shoulders retractions  no diaphoresis, not holding her head/chest/abdomen, appears very comfortable

## 2020-05-03 ENCOUNTER — EMERGENCY (EMERGENCY)
Facility: HOSPITAL | Age: 40
LOS: 1 days | Discharge: HOME | End: 2020-05-03
Attending: STUDENT IN AN ORGANIZED HEALTH CARE EDUCATION/TRAINING PROGRAM | Admitting: STUDENT IN AN ORGANIZED HEALTH CARE EDUCATION/TRAINING PROGRAM
Payer: COMMERCIAL

## 2020-05-03 ENCOUNTER — EMERGENCY (EMERGENCY)
Facility: HOSPITAL | Age: 40
LOS: 0 days | Discharge: HOME | End: 2020-05-03
Attending: EMERGENCY MEDICINE
Payer: COMMERCIAL

## 2020-05-03 VITALS
TEMPERATURE: 98 F | RESPIRATION RATE: 16 BRPM | HEART RATE: 94 BPM | SYSTOLIC BLOOD PRESSURE: 121 MMHG | OXYGEN SATURATION: 98 % | DIASTOLIC BLOOD PRESSURE: 85 MMHG

## 2020-05-03 VITALS
SYSTOLIC BLOOD PRESSURE: 133 MMHG | HEART RATE: 102 BPM | HEIGHT: 59 IN | RESPIRATION RATE: 24 BRPM | DIASTOLIC BLOOD PRESSURE: 94 MMHG | WEIGHT: 141.1 LBS | OXYGEN SATURATION: 98 % | TEMPERATURE: 98 F

## 2020-05-03 VITALS
TEMPERATURE: 99 F | DIASTOLIC BLOOD PRESSURE: 95 MMHG | SYSTOLIC BLOOD PRESSURE: 112 MMHG | HEART RATE: 94 BPM | RESPIRATION RATE: 18 BRPM | OXYGEN SATURATION: 100 %

## 2020-05-03 VITALS
RESPIRATION RATE: 20 BRPM | SYSTOLIC BLOOD PRESSURE: 134 MMHG | HEART RATE: 102 BPM | TEMPERATURE: 98 F | OXYGEN SATURATION: 98 % | DIASTOLIC BLOOD PRESSURE: 94 MMHG | HEIGHT: 59 IN

## 2020-05-03 DIAGNOSIS — Z98.89 OTHER SPECIFIED POSTPROCEDURAL STATES: Chronic | ICD-10-CM

## 2020-05-03 DIAGNOSIS — M41.9 SCOLIOSIS, UNSPECIFIED: ICD-10-CM

## 2020-05-03 DIAGNOSIS — Z86.711 PERSONAL HISTORY OF PULMONARY EMBOLISM: ICD-10-CM

## 2020-05-03 DIAGNOSIS — M19.90 UNSPECIFIED OSTEOARTHRITIS, UNSPECIFIED SITE: ICD-10-CM

## 2020-05-03 DIAGNOSIS — R00.2 PALPITATIONS: ICD-10-CM

## 2020-05-03 LAB
ALBUMIN SERPL ELPH-MCNC: 3.4 G/DL — SIGNIFICANT CHANGE UP (ref 3.3–5)
ALP SERPL-CCNC: 57 U/L — SIGNIFICANT CHANGE UP (ref 40–120)
ALT FLD-CCNC: 27 U/L — SIGNIFICANT CHANGE UP (ref 12–78)
ANION GAP SERPL CALC-SCNC: 6 MMOL/L — SIGNIFICANT CHANGE UP (ref 5–17)
AST SERPL-CCNC: 13 U/L — LOW (ref 15–37)
BILIRUB SERPL-MCNC: 0.3 MG/DL — SIGNIFICANT CHANGE UP (ref 0.2–1.2)
BUN SERPL-MCNC: 9 MG/DL — SIGNIFICANT CHANGE UP (ref 7–23)
CALCIUM SERPL-MCNC: 8.7 MG/DL — SIGNIFICANT CHANGE UP (ref 8.5–10.1)
CHLORIDE SERPL-SCNC: 105 MMOL/L — SIGNIFICANT CHANGE UP (ref 96–108)
CO2 SERPL-SCNC: 28 MMOL/L — SIGNIFICANT CHANGE UP (ref 22–31)
CREAT SERPL-MCNC: 0.72 MG/DL — SIGNIFICANT CHANGE UP (ref 0.5–1.3)
GLUCOSE SERPL-MCNC: 115 MG/DL — HIGH (ref 70–99)
HCT VFR BLD CALC: 34.2 % — LOW (ref 34.5–45)
HGB BLD-MCNC: 11.4 G/DL — LOW (ref 11.5–15.5)
MCHC RBC-ENTMCNC: 30 PG — SIGNIFICANT CHANGE UP (ref 27–34)
MCHC RBC-ENTMCNC: 33.3 GM/DL — SIGNIFICANT CHANGE UP (ref 32–36)
MCV RBC AUTO: 90 FL — SIGNIFICANT CHANGE UP (ref 80–100)
NRBC # BLD: 0 /100 WBCS — SIGNIFICANT CHANGE UP (ref 0–0)
PLATELET # BLD AUTO: 300 K/UL — SIGNIFICANT CHANGE UP (ref 150–400)
POTASSIUM SERPL-MCNC: 3.4 MMOL/L — LOW (ref 3.5–5.3)
POTASSIUM SERPL-SCNC: 3.4 MMOL/L — LOW (ref 3.5–5.3)
PROT SERPL-MCNC: 7.7 GM/DL — SIGNIFICANT CHANGE UP (ref 6–8.3)
RBC # BLD: 3.8 M/UL — SIGNIFICANT CHANGE UP (ref 3.8–5.2)
RBC # FLD: 13.4 % — SIGNIFICANT CHANGE UP (ref 10.3–14.5)
SODIUM SERPL-SCNC: 139 MMOL/L — SIGNIFICANT CHANGE UP (ref 135–145)
T4 AB SER-ACNC: 8.5 UG/DL — SIGNIFICANT CHANGE UP (ref 4.6–12)
TROPONIN I SERPL-MCNC: <.015 NG/ML — SIGNIFICANT CHANGE UP (ref 0.01–0.04)
TSH SERPL-MCNC: 0.47 UIU/ML — SIGNIFICANT CHANGE UP (ref 0.36–3.74)
WBC # BLD: 5.17 K/UL — SIGNIFICANT CHANGE UP (ref 3.8–10.5)
WBC # FLD AUTO: 5.17 K/UL — SIGNIFICANT CHANGE UP (ref 3.8–10.5)

## 2020-05-03 PROCEDURE — 93010 ELECTROCARDIOGRAM REPORT: CPT

## 2020-05-03 PROCEDURE — 99283 EMERGENCY DEPT VISIT LOW MDM: CPT

## 2020-05-03 PROCEDURE — 99285 EMERGENCY DEPT VISIT HI MDM: CPT

## 2020-05-03 RX ORDER — SUCRALFATE 1 G
1 TABLET ORAL
Qty: 120 | Refills: 0
Start: 2020-05-03 | End: 2020-06-01

## 2020-05-03 RX ORDER — ATENOLOL 25 MG/1
1 TABLET ORAL
Qty: 60 | Refills: 0
Start: 2020-05-03 | End: 2020-06-01

## 2020-05-03 RX ORDER — POTASSIUM CHLORIDE 20 MEQ
40 PACKET (EA) ORAL ONCE
Refills: 0 | Status: COMPLETED | OUTPATIENT
Start: 2020-05-03 | End: 2020-05-03

## 2020-05-03 RX ADMIN — Medication 40 MILLIEQUIVALENT(S): at 10:42

## 2020-05-03 NOTE — ED PROVIDER NOTE - OBJECTIVE STATEMENT
40 year old female came to the ED because of palpitations. She has had palpitations on and off for a month. She has been to the ED multiple times. Last Thursday she drove to [facility] and was diagnosed with a PE. She continues to have palpitations on and off. She was diagnosed with the FLU april 19 and has been tested for COVID-19 four times.  no fever, no chills, no urinary complaints, no rash, no headache, no back pain.

## 2020-05-03 NOTE — ED PROVIDER NOTE - NSFOLLOWUPINSTRUCTIONS_ED_ALL_ED_FT
-Follow-up with your Primary Care Doctor within the week.   -Continue taking your Eliquis as prescribed.  -Rest and stay well hydrated  -Take over the counter acetaminophen (Tylenol) 650-1000 mg every every 4-6 hours as needed for fever/pain. Do not take more than 4000 mg in a 24 hour period. Be aware many over the counter and prescription medications also contain acetaminophen (Tylenol).    -Return to the Emergency Department for any symptoms such as worsening shortness of breath, significant worsening cough, high fevers despite over the counter fever-reducing medications, or severe weakness/malaise.

## 2020-05-03 NOTE — ED PROVIDER NOTE - CLINICAL SUMMARY MEDICAL DECISION MAKING FREE TEXT BOX
Pt with palpitations, had CTA yesterday, not hypoxic and not tachy, now adds that it is worse when eating, Potassium replaced, will dc to follow up with cardiology. Precautions reviewed.

## 2020-05-03 NOTE — ED PROVIDER NOTE - ATTENDING CONTRIBUTION TO CARE
Hailey De La Torre M.D: 40F recently diagnosed at [facility] with PE, now on eliquis, presents with episode of feeling winded this AM no assoc cp/abd pain n/v, diaphoresis. has been feeling sick for the last month intermittently , was flu positive, has had multiple negative covid swabs as well. was seen at [facility] this AM for palpitations had workup including labs and troponin which was negative. had CTA yesterday which was also negative for PE. at this time pt notes symptoms have resolved and she feels well and would like yto go home. on exam lungs ctab heart rrr abd soft ntnd no le edema, rest of exam per resident documentation. now here with episode of sob which has resolved ddx includes persistent viral ifnection, residual PE, possible anxiety. at this point pt in no resp distress, not hypoxic, ahd labs and imaging today which were reassuring and symptoms have resolved. no indication for further workup at this time. clear for dc.

## 2020-05-03 NOTE — ED PROVIDER NOTE - OBJECTIVE STATEMENT
39yo F no pmh presenting after feeling "winded" earlier today when walking. Feels better now, no cp, cough, fevers, headache, palpitations. Patient has been feeling ill off and on for a month. Diagnosed with FLU on 4/18. Diagnosed with PE and started on Eliquis at [facility] last Thursday. Seen ealier today at 8am in the ED at [place] for palpitations and was discharged. She was also seen on 5/2 with negative CTA for PE. Has pulse ox at home which she has been checking, lowest it has been is 93%. She has had 4 negative covid tests, the last negative was from 4/29.

## 2020-05-03 NOTE — ED ADULT NURSE NOTE - NSIMPLEMENTINTERV_GEN_ALL_ED
Implemented All Universal Safety Interventions:  [place] to call system. Call bell, personal items and telephone within reach. Instruct patient to call for assistance. Room bathroom lighting operational. Non-slip footwear when patient is off stretcher. Physically safe environment: no spills, clutter or unnecessary equipment. Stretcher in lowest position, wheels locked, appropriate side rails in place.

## 2020-05-03 NOTE — ED ADULT NURSE NOTE - OBJECTIVE STATEMENT
Pt is  a 40YOF who is here for palpitations, pt states that she was diagnosed on 4/30 with a PE, pt states she was negative for covid on 3 occasions recently, pt states she does not have any difficulty breathing, but she does have some dyspnea on exertion, no fever, chills, nausea, vomiting, denies any headaches, denies any visual disturbances, denies any recent injury, pt states that she has no neurological symptoms at this time.

## 2020-05-03 NOTE — ED ADULT NURSE NOTE - OBJECTIVE STATEMENT
Pt. presents to room 2, a&ox4, ambulatory, and self-care. Pt. c/o feeling "winded" today while walking. Pt. states she has been tested four times for COVID and all tests came back negative. Pt. also was recently diagnosed with a PE and is on Eliquis. Respirations appear even and unlabored. VS as noted. PHx of RA, scoliosis, and PE. Patient has equal strength in extremities noted. Pt. in no acute distress. Awaiting further orders. Will continue to monitor. Pt. presents to room 2, a&ox4, ambulatory, and self-care. Pt. c/o feeling "winded" today while walking. Pt. states she has been tested four times for COVID and all tests came back negative. Pt. also was recently diagnosed with a PE and is on Eliquis. Pt. denies any CP, HA, fever, chills, nausea, vomiting, abdominal pain, loss of taste or smell. Pt. denies any sick contacts. Respirations appear even and unlabored. VS as noted. PHx of RA, scoliosis, and PE. Patient has equal strength in extremities noted. Pt. in no acute distress. Awaiting further orders. Will continue to monitor.

## 2020-05-03 NOTE — ED PROVIDER NOTE - PATIENT PORTAL LINK FT
You can access the FollowMyHealth Patient Portal offered by [facility] by registering at the following website: [email or web address]. By joining [employer]’s FollowMyHealth portal, you will also be able to view your health information using other applications (apps) compatible with our system.

## 2020-05-03 NOTE — ED PROVIDER NOTE - CLINICAL SUMMARY MEDICAL DECISION MAKING FREE TEXT BOX
39yo F no pmh presenting after feeling "winded" earlier today when walking. On eliquis for PE and had negative CTA on 5/2. Bloodwork checked at ED [facility] this morning and potassium replaced for K 3.4. Patient feeling better now. NAD, appears well, vitals wnl. Flu vs covid vs lingering effects of PE. DC with return precautions.

## 2020-05-03 NOTE — ED PROVIDER NOTE - CARE PROVIDER_API CALL
Triston Sanderson (MD)  Cardiovascular Disease; Internal Medicine; Interventional Cardiology  [address] [place] [address]  Phone: [phone]  Fax: [phone]  Follow Up Time:

## 2020-05-03 NOTE — ED ADULT NURSE NOTE - CHPI ED NUR SYMPTOMS NEG
no congestion/no diaphoresis/no dizziness/no shortness of breath/no back pain/no syncope/no nausea/no vomiting/no fever/no chest pain/no chills

## 2020-05-03 NOTE — ED PROVIDER NOTE - PATIENT PORTAL LINK FT
You can access the FollowMyHealth Patient Portal offered by [facility] by registering at the following website: [email or web address]. By joining [employer]’s FollowMyHealth portal, you will also be able to view your health information using other applications (apps) compatible with our system.

## 2020-05-04 PROBLEM — I26.99 OTHER PULMONARY EMBOLISM WITHOUT ACUTE COR PULMONALE: Chronic | Status: ACTIVE | Noted: 2020-05-03

## 2020-05-15 ENCOUNTER — EMERGENCY (EMERGENCY)
Facility: HOSPITAL | Age: 40
LOS: 0 days | Discharge: HOME | End: 2020-05-15
Attending: EMERGENCY MEDICINE
Payer: COMMERCIAL

## 2020-05-15 VITALS
OXYGEN SATURATION: 99 % | SYSTOLIC BLOOD PRESSURE: 108 MMHG | HEART RATE: 86 BPM | TEMPERATURE: 98 F | RESPIRATION RATE: 15 BRPM | DIASTOLIC BLOOD PRESSURE: 80 MMHG

## 2020-05-15 VITALS
HEIGHT: 59 IN | WEIGHT: 138.01 LBS | OXYGEN SATURATION: 98 % | DIASTOLIC BLOOD PRESSURE: 79 MMHG | HEART RATE: 92 BPM | RESPIRATION RATE: 16 BRPM | TEMPERATURE: 98 F | SYSTOLIC BLOOD PRESSURE: 118 MMHG

## 2020-05-15 DIAGNOSIS — M19.90 UNSPECIFIED OSTEOARTHRITIS, UNSPECIFIED SITE: ICD-10-CM

## 2020-05-15 DIAGNOSIS — M41.9 SCOLIOSIS, UNSPECIFIED: ICD-10-CM

## 2020-05-15 DIAGNOSIS — R07.9 CHEST PAIN, UNSPECIFIED: ICD-10-CM

## 2020-05-15 DIAGNOSIS — I26.99 OTHER PULMONARY EMBOLISM WITHOUT ACUTE COR PULMONALE: ICD-10-CM

## 2020-05-15 DIAGNOSIS — Z98.89 OTHER SPECIFIED POSTPROCEDURAL STATES: Chronic | ICD-10-CM

## 2020-05-15 DIAGNOSIS — R06.02 SHORTNESS OF BREATH: ICD-10-CM

## 2020-05-15 LAB
ALBUMIN SERPL ELPH-MCNC: 3.1 G/DL — LOW (ref 3.3–5)
ALP SERPL-CCNC: 51 U/L — SIGNIFICANT CHANGE UP (ref 40–120)
ALT FLD-CCNC: 40 U/L — SIGNIFICANT CHANGE UP (ref 12–78)
ANION GAP SERPL CALC-SCNC: 5 MMOL/L — SIGNIFICANT CHANGE UP (ref 5–17)
APTT BLD: 30.7 SEC — SIGNIFICANT CHANGE UP (ref 27.5–36.3)
AST SERPL-CCNC: 29 U/L — SIGNIFICANT CHANGE UP (ref 15–37)
BASOPHILS # BLD AUTO: 0.03 K/UL — SIGNIFICANT CHANGE UP (ref 0–0.2)
BASOPHILS NFR BLD AUTO: 0.5 % — SIGNIFICANT CHANGE UP (ref 0–2)
BILIRUB SERPL-MCNC: 0.2 MG/DL — SIGNIFICANT CHANGE UP (ref 0.2–1.2)
BUN SERPL-MCNC: 6 MG/DL — LOW (ref 7–23)
CALCIUM SERPL-MCNC: 8.6 MG/DL — SIGNIFICANT CHANGE UP (ref 8.5–10.1)
CHLORIDE SERPL-SCNC: 109 MMOL/L — HIGH (ref 96–108)
CO2 SERPL-SCNC: 26 MMOL/L — SIGNIFICANT CHANGE UP (ref 22–31)
CREAT SERPL-MCNC: 0.6 MG/DL — SIGNIFICANT CHANGE UP (ref 0.5–1.3)
D DIMER BLD IA.RAPID-MCNC: 505 NG/ML DDU — HIGH
EOSINOPHIL # BLD AUTO: 0.16 K/UL — SIGNIFICANT CHANGE UP (ref 0–0.5)
EOSINOPHIL NFR BLD AUTO: 2.8 % — SIGNIFICANT CHANGE UP (ref 0–6)
GLUCOSE SERPL-MCNC: 91 MG/DL — SIGNIFICANT CHANGE UP (ref 70–99)
HCG SERPL-ACNC: 2 MIU/ML — SIGNIFICANT CHANGE UP
HCT VFR BLD CALC: 31.1 % — LOW (ref 34.5–45)
HGB BLD-MCNC: 10.2 G/DL — LOW (ref 11.5–15.5)
IMM GRANULOCYTES NFR BLD AUTO: 0.2 % — SIGNIFICANT CHANGE UP (ref 0–1.5)
INR BLD: 1.1 RATIO — SIGNIFICANT CHANGE UP (ref 0.88–1.16)
LYMPHOCYTES # BLD AUTO: 1.48 K/UL — SIGNIFICANT CHANGE UP (ref 1–3.3)
LYMPHOCYTES # BLD AUTO: 26.3 % — SIGNIFICANT CHANGE UP (ref 13–44)
MCHC RBC-ENTMCNC: 29.7 PG — SIGNIFICANT CHANGE UP (ref 27–34)
MCHC RBC-ENTMCNC: 32.8 GM/DL — SIGNIFICANT CHANGE UP (ref 32–36)
MCV RBC AUTO: 90.7 FL — SIGNIFICANT CHANGE UP (ref 80–100)
MONOCYTES # BLD AUTO: 0.53 K/UL — SIGNIFICANT CHANGE UP (ref 0–0.9)
MONOCYTES NFR BLD AUTO: 9.4 % — SIGNIFICANT CHANGE UP (ref 2–14)
NEUTROPHILS # BLD AUTO: 3.42 K/UL — SIGNIFICANT CHANGE UP (ref 1.8–7.4)
NEUTROPHILS NFR BLD AUTO: 60.8 % — SIGNIFICANT CHANGE UP (ref 43–77)
NRBC # BLD: 0 /100 WBCS — SIGNIFICANT CHANGE UP (ref 0–0)
PLATELET # BLD AUTO: 315 K/UL — SIGNIFICANT CHANGE UP (ref 150–400)
POTASSIUM SERPL-MCNC: 4.5 MMOL/L — SIGNIFICANT CHANGE UP (ref 3.5–5.3)
POTASSIUM SERPL-SCNC: 4.5 MMOL/L — SIGNIFICANT CHANGE UP (ref 3.5–5.3)
PROT SERPL-MCNC: 7.3 GM/DL — SIGNIFICANT CHANGE UP (ref 6–8.3)
PROTHROM AB SERPL-ACNC: 12.2 SEC — SIGNIFICANT CHANGE UP (ref 10–12.9)
RBC # BLD: 3.43 M/UL — LOW (ref 3.8–5.2)
RBC # FLD: 13.3 % — SIGNIFICANT CHANGE UP (ref 10.3–14.5)
SODIUM SERPL-SCNC: 140 MMOL/L — SIGNIFICANT CHANGE UP (ref 135–145)
TROPONIN I SERPL-MCNC: <.015 NG/ML — SIGNIFICANT CHANGE UP (ref 0.01–0.04)
WBC # BLD: 5.63 K/UL — SIGNIFICANT CHANGE UP (ref 3.8–10.5)
WBC # FLD AUTO: 5.63 K/UL — SIGNIFICANT CHANGE UP (ref 3.8–10.5)

## 2020-05-15 PROCEDURE — 71045 X-RAY EXAM CHEST 1 VIEW: CPT | Mod: 26

## 2020-05-15 PROCEDURE — 71275 CT ANGIOGRAPHY CHEST: CPT | Mod: 26

## 2020-05-15 PROCEDURE — 93010 ELECTROCARDIOGRAM REPORT: CPT

## 2020-05-15 PROCEDURE — 99285 EMERGENCY DEPT VISIT HI MDM: CPT

## 2020-05-15 RX ORDER — APIXABAN 2.5 MG/1
10 TABLET, FILM COATED ORAL
Qty: 0 | Refills: 0 | DISCHARGE

## 2020-05-15 RX ORDER — KETOROLAC TROMETHAMINE 30 MG/ML
30 SYRINGE (ML) INJECTION ONCE
Refills: 0 | Status: DISCONTINUED | OUTPATIENT
Start: 2020-05-15 | End: 2020-05-15

## 2020-05-15 RX ADMIN — Medication 30 MILLIGRAM(S): at 05:24

## 2020-05-15 NOTE — ED ADULT NURSE NOTE - NSIMPLEMENTINTERV_GEN_ALL_ED
Implemented All Universal Safety Interventions:  [place] to call system. Call bell, personal items and telephone within reach. Instruct patient to call for assistance. Room bathroom lighting operational. Non-slip footwear when patient is off stretcher. Physically safe environment: no spills, clutter or unnecessary equipment. Stretcher in lowest position, wheels locked, appropriate side rails in place.

## 2020-05-15 NOTE — ED PROVIDER NOTE - PROGRESS NOTE DETAILS
pt signed out to me from dr galarza, pt presented with chest pain, has been seen for the same complaint in the past, diagnosed with PE, then pericarditis on ibuprofen and has been tested for covid 7 times, all times her results have been negative, ct angio pending ct angio negative for pe, no pericardial effusion or PE, pt told to follow up with her PMD

## 2020-05-15 NOTE — ED ADULT TRIAGE NOTE - CHIEF COMPLAINT QUOTE
SOB, pinching and crushing chest pain since yesterday, worse today heart rate 155 4am.  stopped taking warfarin, diagnosed with  percarditis

## 2020-05-15 NOTE — ED ADULT NURSE NOTE - ED STAT RN HANDOFF DETAILS
Received report from off going nurse. Pt is awake, respiration unlabored,  sitting on stretcher on monitor and  IV access patent. She is pending results  of ct scan

## 2020-05-15 NOTE — ED ADULT TRIAGE NOTE - STATUS:
Intact Graft Donor Site Bandage (Optional-Leave Blank If You Don't Want In Note): Steri-strips and a pressure bandage were applied to the donor site.

## 2020-05-15 NOTE — ED ADULT NURSE NOTE - OBJECTIVE STATEMENT
39 y/o female presents to the ED with chest pain describes as pinching and crushing, states it goes from the right and left side of the chest radiating to the L- side of the back with intermittent dyspnea, states that heart rate at home 150s, increased when eating drinking or exerting. LMP 12 years ago, diagnosed with pericarditis yesterday, stopped taking warfarin may 5th 41 y/o female presents to the ED with chest pain describes as pinching and crushing, states it goes from the right and left side of the chest radiating to the L- side of the back with intermittent dyspnea, states that heart rate at home 150s, increased when eating drinking or exerting. LMP 12 years ago, diagnosed with pericarditis yesterday, stopped taking warfarin may 5th, tested 7 times for covid 19,

## 2020-05-15 NOTE — ED PROVIDER NOTE - PATIENT PORTAL LINK FT
You can access the FollowMyHealth Patient Portal offered by [facility] by registering at the following website: [email or web address]. By joining [employer]’s FollowMyHealth portal, you will also be able to view your health information using other applications (apps) compatible with our system.

## 2020-05-15 NOTE — ED PROVIDER NOTE - OBJECTIVE STATEMENT
40 year old female with Rheumatoid Arthritis, Scoliosis, PE hx presenting to ED due to CP and SOB - was told previously that she had PE and she was on Eloquis then pt visted this ED told was not having PE after negative CTA, then recently with similar issues visited [facility] where pt was told she had pericarditis and was sent home on ibuprofen. Pt otherwise has not had any fever and no cough and has tested negative for Covid x 7 times.

## 2020-05-17 ENCOUNTER — EMERGENCY (EMERGENCY)
Facility: HOSPITAL | Age: 40
LOS: 1 days | Discharge: HOME | End: 2020-05-17
Attending: EMERGENCY MEDICINE
Payer: COMMERCIAL

## 2020-05-17 VITALS
HEART RATE: 104 BPM | DIASTOLIC BLOOD PRESSURE: 77 MMHG | RESPIRATION RATE: 16 BRPM | OXYGEN SATURATION: 99 % | TEMPERATURE: 99 F | WEIGHT: 136.03 LBS | HEIGHT: 59 IN | SYSTOLIC BLOOD PRESSURE: 126 MMHG

## 2020-05-17 VITALS
DIASTOLIC BLOOD PRESSURE: 72 MMHG | RESPIRATION RATE: 18 BRPM | HEART RATE: 92 BPM | OXYGEN SATURATION: 98 % | SYSTOLIC BLOOD PRESSURE: 105 MMHG | TEMPERATURE: 99 F

## 2020-05-17 DIAGNOSIS — Z98.890 OTHER SPECIFIED POSTPROCEDURAL STATES: Chronic | ICD-10-CM

## 2020-05-17 DIAGNOSIS — Z98.89 OTHER SPECIFIED POSTPROCEDURAL STATES: Chronic | ICD-10-CM

## 2020-05-17 LAB
ALBUMIN SERPL ELPH-MCNC: 4.3 G/DL — SIGNIFICANT CHANGE UP (ref 3.3–5)
ALP SERPL-CCNC: 59 U/L — SIGNIFICANT CHANGE UP (ref 40–120)
ALT FLD-CCNC: 26 U/L — SIGNIFICANT CHANGE UP (ref 10–45)
ANION GAP SERPL CALC-SCNC: 13 MMOL/L — SIGNIFICANT CHANGE UP (ref 5–17)
APTT BLD: 28.8 SEC — SIGNIFICANT CHANGE UP (ref 27.5–36.3)
AST SERPL-CCNC: 20 U/L — SIGNIFICANT CHANGE UP (ref 10–40)
BILIRUB SERPL-MCNC: 0.2 MG/DL — SIGNIFICANT CHANGE UP (ref 0.2–1.2)
BUN SERPL-MCNC: 7 MG/DL — SIGNIFICANT CHANGE UP (ref 7–23)
CALCIUM SERPL-MCNC: 9.5 MG/DL — SIGNIFICANT CHANGE UP (ref 8.4–10.5)
CHLORIDE SERPL-SCNC: 102 MMOL/L — SIGNIFICANT CHANGE UP (ref 96–108)
CO2 SERPL-SCNC: 26 MMOL/L — SIGNIFICANT CHANGE UP (ref 22–31)
CREAT SERPL-MCNC: 0.53 MG/DL — SIGNIFICANT CHANGE UP (ref 0.5–1.3)
ERYTHROCYTE [SEDIMENTATION RATE] IN BLOOD: 118 MM/HR — HIGH (ref 0–15)
GLUCOSE SERPL-MCNC: 93 MG/DL — SIGNIFICANT CHANGE UP (ref 70–99)
HCG SERPL-ACNC: <2 MIU/ML — SIGNIFICANT CHANGE UP
HCT VFR BLD CALC: 34.9 % — SIGNIFICANT CHANGE UP (ref 34.5–45)
HGB BLD-MCNC: 11.3 G/DL — LOW (ref 11.5–15.5)
INR BLD: 1.09 RATIO — SIGNIFICANT CHANGE UP (ref 0.88–1.16)
MCHC RBC-ENTMCNC: 29.7 PG — SIGNIFICANT CHANGE UP (ref 27–34)
MCHC RBC-ENTMCNC: 32.4 GM/DL — SIGNIFICANT CHANGE UP (ref 32–36)
MCV RBC AUTO: 91.8 FL — SIGNIFICANT CHANGE UP (ref 80–100)
NRBC # BLD: 0 /100 WBCS — SIGNIFICANT CHANGE UP (ref 0–0)
NT-PROBNP SERPL-SCNC: 14 PG/ML — SIGNIFICANT CHANGE UP (ref 0–300)
PLATELET # BLD AUTO: 315 K/UL — SIGNIFICANT CHANGE UP (ref 150–400)
POTASSIUM SERPL-MCNC: 4.3 MMOL/L — SIGNIFICANT CHANGE UP (ref 3.5–5.3)
POTASSIUM SERPL-SCNC: 4.3 MMOL/L — SIGNIFICANT CHANGE UP (ref 3.5–5.3)
PROT SERPL-MCNC: 8.1 G/DL — SIGNIFICANT CHANGE UP (ref 6–8.3)
PROTHROM AB SERPL-ACNC: 12.5 SEC — SIGNIFICANT CHANGE UP (ref 10–12.9)
RBC # BLD: 3.8 M/UL — SIGNIFICANT CHANGE UP (ref 3.8–5.2)
RBC # FLD: 13.2 % — SIGNIFICANT CHANGE UP (ref 10.3–14.5)
SARS-COV-2 RNA SPEC QL NAA+PROBE: SIGNIFICANT CHANGE UP
SODIUM SERPL-SCNC: 141 MMOL/L — SIGNIFICANT CHANGE UP (ref 135–145)
TROPONIN T, HIGH SENSITIVITY RESULT: 13 NG/L — SIGNIFICANT CHANGE UP (ref 0–51)
TROPONIN T, HIGH SENSITIVITY RESULT: 13 NG/L — SIGNIFICANT CHANGE UP (ref 0–51)
TSH SERPL-MCNC: 0.31 UIU/ML — SIGNIFICANT CHANGE UP (ref 0.27–4.2)
WBC # BLD: 5.82 K/UL — SIGNIFICANT CHANGE UP (ref 3.8–10.5)
WBC # FLD AUTO: 5.82 K/UL — SIGNIFICANT CHANGE UP (ref 3.8–10.5)

## 2020-05-17 PROCEDURE — 93010 ELECTROCARDIOGRAM REPORT: CPT

## 2020-05-17 PROCEDURE — 84702 CHORIONIC GONADOTROPIN TEST: CPT

## 2020-05-17 PROCEDURE — 85027 COMPLETE CBC AUTOMATED: CPT

## 2020-05-17 PROCEDURE — 36415 COLL VENOUS BLD VENIPUNCTURE: CPT

## 2020-05-17 PROCEDURE — 85730 THROMBOPLASTIN TIME PARTIAL: CPT

## 2020-05-17 PROCEDURE — 71046 X-RAY EXAM CHEST 2 VIEWS: CPT

## 2020-05-17 PROCEDURE — 83880 ASSAY OF NATRIURETIC PEPTIDE: CPT

## 2020-05-17 PROCEDURE — 85652 RBC SED RATE AUTOMATED: CPT

## 2020-05-17 PROCEDURE — 85610 PROTHROMBIN TIME: CPT

## 2020-05-17 PROCEDURE — 99285 EMERGENCY DEPT VISIT HI MDM: CPT

## 2020-05-17 PROCEDURE — 84484 ASSAY OF TROPONIN QUANT: CPT

## 2020-05-17 PROCEDURE — 84443 ASSAY THYROID STIM HORMONE: CPT

## 2020-05-17 PROCEDURE — 80053 COMPREHEN METABOLIC PANEL: CPT

## 2020-05-17 PROCEDURE — 93005 ELECTROCARDIOGRAM TRACING: CPT

## 2020-05-17 PROCEDURE — 99284 EMERGENCY DEPT VISIT MOD MDM: CPT | Mod: 25

## 2020-05-17 PROCEDURE — 71046 X-RAY EXAM CHEST 2 VIEWS: CPT | Mod: 26

## 2020-05-17 NOTE — ED PROVIDER NOTE - PHYSICAL EXAMINATION
CONSTITUTIONAL: Nontoxic, well nourished, well developed, middle-aged female, resting comfortably in no acute distress  HEAD: Normocephalic; atraumatic  EYES: Normal inspection, EOMI  ENMT: External appears normal; normal oropharynx  NECK: Supple; non-tender; no cervical lymphadenopathy  CARD: tachycardic; no audible murmurs, rubs, or gallops  RESP: No respiratory distress, lungs ctab/l  ABD: Soft, non-distended; non-tender; no rebound or guarding  EXT: No LE pitting edema or calf tenderness; distal pulses intact with good capillary refill  SKIN: Warm, dry, intact, well healed surgical scar along midline back   NEURO: aaox3, moving all extremities spontaneously

## 2020-05-17 NOTE — ED PROVIDER NOTE - CLINICAL SUMMARY MEDICAL DECISION MAKING FREE TEXT BOX
Debby Peters MD: 39yo F with PMH of scoliosis s/p spinal surgery who presents with palpitations and chest pain since 4/30 with multiple PE work ups at various hospitals limited by hardware. Now off anticoagulants with diagnosis of pericarditis, flu A+. Will get labs, EKG, CXR, CDU for echo Debby Peters MD: 41yo F with PMH of scoliosis s/p spinal surgery, RA who presents with palpitations and chest pain since 4/30 with multiple PE work ups at various hospitals limited by hardware. Now off anticoagulants with diagnosis of pericarditis, flu A+. Will get labs, EKG, CXR, CDU for echo

## 2020-05-17 NOTE — ED ADULT TRIAGE NOTE - CHIEF COMPLAINT QUOTE
fatigue; occasional chest pain; oxygen level going up and down; was in another er dx with pericarditis; went to cardiologist friday was given aspirin

## 2020-05-17 NOTE — ED PROVIDER NOTE - NS ED ROS FT
General: denies fever, chills  HENT: denies nasal congestion, sore throat, rhinorrhea  Eyes: denies vision changes  CV: +chest pain  Resp: +difficulty breathing, cough  Abdominal: denies nausea, vomiting, diarrhea, abdominal pain, blood in stool, dark stool  : denies pain with urination  MSK: denies recent trauma  Neuro: denies headaches, numbness, tingling, dizziness, lightheadedness.  Skin: denies new rashes  Endocrine: denies recent weight loss

## 2020-05-17 NOTE — ED ADULT NURSE NOTE - OBJECTIVE STATEMENT
1357 40 yr old female c/o palpitations and chest tightness x a few days, worsening today. States has Pericarditis currently that was diagnosed 5/12/2020. On Aspirin at home for tx of pericarditis. Not feeling well since 3/31/2020. "Was tested 7 times for COVID 19 , negative every time. "  Diagnosed with Pulmonary embolism 4/30/2020. Reeval at [place] 5/5 and was told that she does not have a pulm embolism. "Was told at [place], it was read wrong because she has a lizzie in her back, that was placed 1993 for tx of Scoliosis.   Denies fever or chills. +Night sweats last night. A&Ox4 Ambulatory. also states she has a pulse ox at home and sometimes it drops to 88%. EKG was done in triage. Tested positive for the flu 4/19/2020. skin W&D. Lips and nailbeds pink 1357 40 yr old female c/o palpitations and chest tightness x a few days, worsening today. States has Pericarditis currently that was diagnosed 5/12/2020. On Aspirin at home for tx of pericarditis. Not feeling well since 3/31/2020. "Was tested 7 times for COVID 19 , negative every time. "  Diagnosed with Pulmonary embolism 4/30/2020. Reeval at [place] 5/5 and was told that she does not have a pulm embolism. "Was told at [place], it was read wrong because she has a lizzie in her back, that was placed 1993 for tx of Scoliosis.   Denies fever or chills. +Night sweats last night. A&Ox4 Ambulatory. also states she has a pulse ox at home and sometimes it drops to 88% and heart rate goes up to 155.EKG was done in triage. Tested positive for the flu 4/19/2020. skin W&D. Lips and nailbeds pink 1357 40 yr old female c/o palpitations and chest tightness x a few days, worsening today. States has Pericarditis currently that was diagnosed 5/12/2020. On Aspirin at home for tx of pericarditis. Not feeling well since 3/31/2020. "Was tested 7 times for COVID 19 , negative every time. "  Diagnosed with Pulmonary embolism 4/30/2020 and started on eliquis at that time. Reeval at [place] 5/5 and was told that she does not have a pulm embolism. "Was told at [place], it was read wrong because she has a lizzie in her back, that was placed 1993 for tx of Scoliosis. Eliquis was discontinued at that time.  Denies fever or chills. +Night sweats last night. A&Ox4 Ambulatory. also states she has a pulse ox at home and sometimes it drops to 88% and heart rate goes up to 155.EKG was done in triage. Tested positive for the flu 4/19/2020. skin W&D. Lips and nailbeds pink. airborne and contact isolation maintained.

## 2020-05-17 NOTE — ED PROVIDER NOTE - NSFOLLOWUPINSTRUCTIONS_ED_ALL_ED_FT
You came to the ER today because you had an episode of hypoxia. We did an EKG and didn’t see any large abnormality that necessitated that you stay in the hospital for further evaluation however, it is very important that you follow up with your primary care doctor within the next 5 days. Additionally, if you pass out or feel like you will pass out, if you develop chest pain, abdominal pain, nausea, vomiting, dark stools, bloody stools, shortness of breath, palpitations please make sure to seek IMMEDIATE MEDICAL ATTENTION as this could be a sign of something more serious, like a heart attack, valve disease, intraabdominal bleeding, gastro intestinal bleeding, or stroke.   You have the information for your cardiologist and we recommend that you follow up with them as they may recommend a loop recorder to be placed to monitor your heart or may start you on a beta blocker for further management of your symptoms. Please make sure to follow up with them in the next 5-7 days as well.

## 2020-05-17 NOTE — ED PROVIDER NOTE - OBJECTIVE STATEMENT
Debby Peters MD: 39yo F with PMH of scoliosis s/p spinal lizzie surgery who presents with palpitations and chest pain since 4/30. Pain is L and R sided, sharp, lasts briefly, not associated with palpitations. Pt has pulse ox at home with O2 measuring 87% to 93%. Pt has been evaluated at [facility], diagnosed with PE on CTA chest and placed to Eliquis. Subsequent visit and repeat CTA at [facility] on 5/4 shows PE on report, but pt states she was taken off Eliquis and placed on asa 325mg TID for pericarditis. Then evaluated at [facility] with 2x CTA chest showing no central airway PE, but limited studies due to artifact. Flu+ on 4/19. Debby Peters MD: 41yo F with PMH of scoliosis s/p spinal lizzie surgery who presents with palpitations and chest pain since 4/30. Pain is L and R sided, sharp, lasts briefly, not associated with palpitations. Pt has pulse ox at home with O2 measuring 87% to 93%. Pt has been evaluated at [facility], diagnosed with PE on CTA chest and placed to Eliquis. Subsequent visit and repeat CTA at [facility] on 5/4 shows PE on report, but pt states she was taken off Eliquis and placed on asa 325mg TID for pericarditis. Then evaluated at [facility] with 2x CTA chest showing no central airway PE, but limited studies due to artifact. Flu+ on 4/19. COVID neg x7. Debby Peters MD: 39yo F with PMH of scoliosis s/p spinal lizzie surgery, RA who presents with palpitations and chest pain since 4/30. Pain is L and R sided, sharp, lasts briefly, not associated with palpitations. Pt has pulse ox at home with O2 measuring 87% to 93%. Pt has been evaluated at [facility], diagnosed with PE on CTA chest and placed to Eliquis. Subsequent visit and repeat CTA at [facility] on 5/4 shows PE on report, but pt states she was taken off Eliquis and placed on asa 325mg TID for pericarditis. Then evaluated at [facility] with 2x CTA chest showing no central airway PE, but limited studies due to artifact. Flu+ on 4/19. COVID neg x7.

## 2020-05-17 NOTE — ED PROVIDER NOTE - PROGRESS NOTE DETAILS
Hilda Ohara MD extensive discussion w/ the patient regarding findings on labs, trop stable, pt is nontoxic appearing, hr is NSR, she is not hypoxic. pt states the initial reasons she presented to the ED was her pulse ox was 88 percent and then improved to 93 on RA. pt is following w/ cardiology ECHO performed on Friday reveals normal ef w/ trace effusion. She denies any chest pain, denies shortness of breath reports intermittent palpitations, had a negative CTPE study to the segmental pulmonary arteries. Pt was observed in the ED for an extended period of time, her pulse ox on my eval is 100 percent, she is not tachypneic, she reports mild anxiety has no SI/HI. she reports she will call her PCP tomorrow for appointment. she is able to ambulate in the department w/out difficulty.

## 2020-05-17 NOTE — ED ADULT NURSE NOTE - NSIMPLEMENTINTERV_GEN_ALL_ED
Implemented All Universal Safety Interventions:  [place] to call system. Call bell, personal items and telephone within reach. Instruct patient to call for assistance. Room bathroom lighting operational. Non-slip footwear when patient is off stretcher. Physically safe environment: no spills, clutter or unnecessary equipment. Stretcher in lowest position, wheels locked, appropriate side rails in place.

## 2020-05-17 NOTE — ED PROVIDER NOTE - ATTENDING CONTRIBUTION TO CARE
RGUJRAL 39yo f hx Scoliosis, RA presents with SOB and palpitations today. States her symptoms began on April 30th when she was seen at [facility] and diagnosed with PE on placed on eliquis. Pt than had multiple hospital visits where she was told she does not have a PE and it was artifact and taken off her AC. She was evaluated for Pulm HTN and Pericarditis and is now on ASA. Pt states she is still having palpitations and noted her oxygen level was low at home. States she feels better at this time. Had COVID testing x 7 negative and serology negative.   On exam, Patient is awake,alert,oriented x 3. Patient is well appearing and in no acute distress. Patient's chest is clear to ausculation, +s1s2. Abdomen is soft nd/nt +BS. Extremity with no swelling or calf tenderness.   Case dw rads, say that due to pt hardware they can only tell up to segmental arteries, and V/Q also only looks till than.   CHeck labs, Xray chest. Eval for symptoms.   Consider CDU for ECHO for recent diagnosis of pericarditis. Pt signed out to Dr. Ohara.

## 2020-05-17 NOTE — ED ADULT NURSE REASSESSMENT NOTE - NS ED NURSE REASSESS COMMENT FT1
Report taken from KATHY Vance. Pt received sitting in bed, A&O x 3. Denying chest pain, cough, or SOB at this time. She states symptoms are intermittent and not occurring now. SV rhythm on cardiac monitor. Saturating well on room air. Labs drawn and sent. Awaiting CT and dispo. Call bell within reach.

## 2020-05-17 NOTE — ED PROVIDER NOTE - PATIENT PORTAL LINK FT
You can access the FollowMyHealth Patient Portal offered by [facility] by registering at the following website: [email or web address]. By joining [employer]’s FollowMyHealth portal, you will also be able to view your health information using other applications (apps) compatible with our system.

## 2020-05-18 ENCOUNTER — APPOINTMENT (OUTPATIENT)
Dept: PULMONOLOGY | Facility: CLINIC | Age: 40
End: 2020-05-18
Payer: COMMERCIAL

## 2020-05-18 VITALS
BODY MASS INDEX: 25.68 KG/M2 | HEART RATE: 99 BPM | OXYGEN SATURATION: 96 % | TEMPERATURE: 98.4 F | SYSTOLIC BLOOD PRESSURE: 110 MMHG | DIASTOLIC BLOOD PRESSURE: 64 MMHG | HEIGHT: 61 IN | RESPIRATION RATE: 17 BRPM | WEIGHT: 136 LBS

## 2020-05-18 DIAGNOSIS — Z81.3 FAMILY HISTORY OF OTHER PSYCHOACTIVE SUBSTANCE ABUSE AND DEPENDENCE: ICD-10-CM

## 2020-05-18 DIAGNOSIS — Z86.19 PERSONAL HISTORY OF OTHER INFECTIOUS AND PARASITIC DISEASES: ICD-10-CM

## 2020-05-18 DIAGNOSIS — Z87.39 PERSONAL HISTORY OF OTHER DISEASES OF THE MUSCULOSKELETAL SYSTEM AND CONNECTIVE TISSUE: ICD-10-CM

## 2020-05-18 DIAGNOSIS — Z83.79 FAMILY HISTORY OF OTHER DISEASES OF THE DIGESTIVE SYSTEM: ICD-10-CM

## 2020-05-18 DIAGNOSIS — Z87.19 PERSONAL HISTORY OF OTHER DISEASES OF THE DIGESTIVE SYSTEM: ICD-10-CM

## 2020-05-18 DIAGNOSIS — M06.9 RHEUMATOID ARTHRITIS, UNSPECIFIED: ICD-10-CM

## 2020-05-18 LAB
BASOPHILS # BLD AUTO: 0.04 K/UL
BASOPHILS NFR BLD AUTO: 0.7 %
EOSINOPHIL # BLD AUTO: 0.08 K/UL
EOSINOPHIL NFR BLD AUTO: 1.3 %
ERYTHROCYTE [SEDIMENTATION RATE] IN BLOOD BY WESTERGREN METHOD: 76 MM/HR
FERRITIN SERPL-MCNC: 162 NG/ML
HCT VFR BLD CALC: 34.3 %
HGB BLD-MCNC: 10.9 G/DL
IMM GRANULOCYTES NFR BLD AUTO: 0.3 %
IRON SATN MFR SERPL: 17 %
IRON SERPL-MCNC: 48 UG/DL
LYMPHOCYTES # BLD AUTO: 1.54 K/UL
LYMPHOCYTES NFR BLD AUTO: 25.6 %
MAN DIFF?: NORMAL
MCHC RBC-ENTMCNC: 29.9 PG
MCHC RBC-ENTMCNC: 31.8 GM/DL
MCV RBC AUTO: 94.2 FL
MONOCYTES # BLD AUTO: 0.5 K/UL
MONOCYTES NFR BLD AUTO: 8.3 %
NEUTROPHILS # BLD AUTO: 3.84 K/UL
NEUTROPHILS NFR BLD AUTO: 63.8 %
PLATELET # BLD AUTO: 346 K/UL
RBC # BLD: 3.64 M/UL
RBC # FLD: 13.2 %
TIBC SERPL-MCNC: 287 UG/DL
UIBC SERPL-MCNC: 239 UG/DL
WBC # FLD AUTO: 6.02 K/UL

## 2020-05-18 PROCEDURE — 71046 X-RAY EXAM CHEST 2 VIEWS: CPT

## 2020-05-18 PROCEDURE — 94618 PULMONARY STRESS TESTING: CPT

## 2020-05-18 PROCEDURE — 99204 OFFICE O/P NEW MOD 45 MIN: CPT | Mod: 25

## 2020-05-18 RX ORDER — PANTOPRAZOLE SODIUM 40 MG/1
40 TABLET, DELAYED RELEASE ORAL
Refills: 0 | Status: ACTIVE | COMMUNITY

## 2020-05-18 RX ORDER — FAMOTIDINE 40 MG/1
40 TABLET, FILM COATED ORAL
Qty: 90 | Refills: 1 | Status: ACTIVE | COMMUNITY
Start: 2020-05-18 | End: 1900-01-01

## 2020-05-18 RX ORDER — IBUPROFEN 400 MG/1
400 TABLET, FILM COATED ORAL 4 TIMES DAILY
Qty: 120 | Refills: 0 | Status: ACTIVE | COMMUNITY
Start: 2020-05-18 | End: 1900-01-01

## 2020-05-18 RX ORDER — COLCHICINE 0.6 MG/1
0.6 CAPSULE ORAL
Qty: 60 | Refills: 1 | Status: ACTIVE | COMMUNITY
Start: 2020-05-18 | End: 1900-01-01

## 2020-05-18 NOTE — HISTORY OF PRESENT ILLNESS
[TextBox_4] : Ms. BILL is a 40 year old female presenting to the office today for initial pulmonary evaluation. Her chief complaint is\par \par -she notes fever in late march\par -she notes positive COVID test early April\par -she notes palpitations constant since April\par -she notes Antibody tests negative\par -she notes reflux\par -she notes SOB\par -she notes cough to get deeper breath\par -she notes clear phlegm production\par -she notes aiming to drink more water to get properly hydrated\par -she notes sinuses not drippy or congested\par -she notes weight stable\par -she notes aiming to lose 10 lbs\par -she notes sleep is variable\par -she notes some nights not restful\par -she denies snoring\par -She notes Her bowels are regular \par -she notes vision is good\par -she notes nocturia 1 time per night\par -she notes [facility] claim PE and treated with Elequis\par -she notes follow up CT in [place], reporting no PE\par -she notes [facility] saw clots and compared to [facility] and think rods mistaken for clots\par -she notes sees cardiologist \par -she notes visit [facility] suspect pericarditis and given Rx of Aspirin for 30 days\par -she notes night sweats past two nights\par -she notes warm to palpation, but no fever\par -she notes no menstrual cycle/ doesn't get period \par \par -she denies any headaches, nausea, vomiting, fever, chills, chest pain, chest pressure, diarrhea, constipation, dysphagia, dizziness, leg swelling, leg pain, itchy eyes, itchy ears, sour taste in the mouth, myalgias or arthralgias.

## 2020-05-18 NOTE — ASSESSMENT
[FreeTextEntry1] : Ms. BILL is a 40 year old female with a history of RA, scoliosis, childhood chickenpox, non smoker, GERD, 45 days of respiratory Sx, misdiagnosed with PE who comes into the office today for pulmonary evaluation\par \par The patient's shortness of breath is multifactorial due to:\par -pulmonary disease \par      -?Pleuropericarditis, ?asthma, ?ALYSSA/ Poor sleep, ?anemia\par -poor breathing mechanics \par -overweight/out of shape\par -?cardiac disease () ?pleuropericarditis (no evidence PE)\par \par \par Problem 1: ?Pleuropericarditis\par -complete ESR\par -Add Colchicine 0.6 mg BID\par -Add Motrin 400 mg qday\par \par Problem 2: ?Asthma\par - r/o pleuropericarditis first\par -Asthma is believed to be caused by inherited (genetic) and environmental factor, but its exact cause is unknown. Asthma may be triggered by allergens, lung infections, or irritants in the air. Asthma triggers are different for each person \par \par Problem 3:GERD\par -pantoprazole\par -Add Pepcid 40 mg QHS \par -Rule of 2s: avoid eating too much, eating too fast, eating too late, eating too spicy, eating too lousy, eating two hours before bed.\par -Things to avoid including overeating, spicy foods, tight clothing, eating within three hours of bed, this list is not all inclusive. \par -For treatment of reflux, possible options discussed including diet control, H2 blockers, PPIs, as well as coating motility agents discussed as treatment options. Timing of meals and proximity of last meal to sleep were discussed. If symptoms persist, a formal gastrointestinal evaluation is needed.\par \par Problem 4: Anemia\par -Complete Iron studies\par -Complete CBC\par \par Problem 5: ?ALYSSA (doubtful)/ Poor sleep\par -Recommended Sleep Hygiene\par -Complete CBC\par Sleep apnea is associated with adverse clinical consequences which an affect most organ systems. Cardiovascular disease risk includes arrhythmias, atrial fibrillation, hypertension, coronary artery disease, and stroke. Metabolic disorders include diabetes type 2, non-alcoholic fatty liver disease. Mood disorder especially depression; and cognitive decline especially in the elderly. Associations with chronic reflux/Minor’s esophagus some but not all inclusive. \par -Reasons include arousal consistent with hypopnea; respiratory events most prominent in REM sleep or supine position; therefore sleep staging and body position are important for accurate diagnosis and estimation of AHI. \par \par Problem 6: Poor Mechanics of Breathing\par - Proper breathing techniques were reviewed with an emphasis of exhalation. Patient instructed to breath in for 1 second and out for four seconds. Patient was encouraged to not talk while walking. \par \par Problem 7: Overweight\par -Weight loss, exercise, and diet control were discussed and are highly encouraged. Treatment options were given such as, aqua therapy, and contacting a nutritionist. Recommended to use the elliptical, stationary bike, less use of treadmill. Mindful eating was explained to the patient Obesity is associated with worsening asthma, shortness of breath, and potential for cardiac disease, diabetes, and other underlying medical conditions. \par \par Problem 8: Cardiac (Dr. Lopez) -r/o Pleuropericarditis\par -recommended to follow up with a cardiologist\par -complete ESR\par -Add Colchicine 0.6 mg BID\par -Add Motrin 400 mg qday\par \par problem 9: Health Maintenance/COVID19 Precautions:\par - Clean your hands often. Wash your hands often with soap and water for at least 20 seconds, especially after blowing your nose, coughing, or sneezing, or having been in a public place.\par - If soap and water are not available, use a hand  that contains at least 60% alcohol.\par - To the extent possible, avoid touching high-touch surfaces in public places - elevator buttons, door handles, handrails, handshaking with people, etc. Use a tissue or your sleeve to cover your hand or finger if you must touch something.\par - Wash your hands after touching surfaces in public places.\par - Avoid touching your face, nose, eyes, etc.\par - Clean and disinfect your home to remove germs: practice routine cleaning of frequently touched surfaces (for example: tables, doorknobs, light switches, handles, desks, toilets, faucets, sinks & cell phones)\par - Avoid crowds, especially in poorly ventilated spaces. Your risk of exposure to respiratory viruses like COVID-19 may increase in crowded, closed-in settings with little air circulation if there are people in the crowd who are sick. All patients are recommended to practice social distancing and stay at least 6 feet away from others.\par - Avoid all non-essential travel including plane trips, and especially avoid embarking on cruise ships.\par -If COVID-19 is spreading in your community, take extra measures to put distance between yourself and other people to further reduce your risk of being exposed to this new virus.\par -Stay home as much as possible.\par - Consider ways of getting food brought to your house through family, social, or commercial networks\par -Be aware that the virus has been known to live in the air up to 3 hours post exposure, cardboard up to 24 hours post exposure, copper up to 4 hours post exposure, steel and plastic up to 2-3 days post exposure. Risk of transmission from these surfaces are affected by many variables.\par Immune Support Recommendations:\par -OTC Vitamin C 500mg BID \par -OTC Quercetin 250-500mg BID \par -OTC Zinc 75-100mg per day \par -OTC Melatonin 1 or 2 mg a night \par -OTC Vitamin D 1-4000mg per day \par -OTC Tonic Water 8oz per day\par Asthma and COVID19:\par You need to make sure your asthma is under control. This often requires the use of inhaled corticosteroids (and sometimes oral corticosteroids). Inhaled corticosteroids do not likely reduce your immune system’s ability to fight infections, but oral corticosteroids may. It is important to use the steps above to protect yourself to limit your exposure to any respiratory virus. \par \par Problem 10: health maintenance\par -s/p influenza vaccine\par -recommended strep pneumonia vaccines after age 65: Prevnar-13 vaccine, followed by Pneumo vaccine 23 one year following\par -recommended early intervention for URIs\par -recommended regular osteoporosis evaluations\par -recommended early dermatological evaluations\par -recommended after the age of 50 to the age of 70, colonoscopy every 5 years \par \par  Follow up in 6-8 weeks\par -he  is recommended to call with any changes, questions, or concerns.

## 2020-05-18 NOTE — PHYSICAL EXAM
[No Acute Distress] : no acute distress [Normal Oropharynx] : normal oropharynx [Normal Appearance] : normal appearance [No Neck Mass] : no neck mass [Normal Rate/Rhythm] : normal rate/rhythm [Normal S1, S2] : normal s1, s2 [No Murmurs] : no murmurs [No Resp Distress] : no resp distress [Clear to Auscultation Bilaterally] : clear to auscultation bilaterally [Benign] : benign [No Abnormalities] : no abnormalities [Normal Gait] : normal gait [No Clubbing] : no clubbing [No Cyanosis] : no cyanosis [No Edema] : no edema [Normal Color/ Pigmentation] : normal color/ pigmentation [FROM] : FROM [Oriented x3] : oriented x3 [No Focal Deficits] : no focal deficits [Normal Affect] : normal affect [II] : Mallampati Class: II [TextBox_68] : I:E ratio 1:3; clear

## 2020-05-18 NOTE — REASON FOR VISIT
[Initial] : an initial visit [TextBox_44] : ?Pleuropericarditis, ?asthma, ?ALYSSA/ Poor sleep, ?anemia

## 2020-05-18 NOTE — PROCEDURE
[FreeTextEntry1] : CXR reveals a normal sized heart; no evidence of infiltrate or effusion--a normal appearing chest radiograph. Bailey's rods in back\par \par 6 minute walk test reveals a low saturation of 96% with no evidence of dyspnea or fatigue; walked 505.3 meters.\par \par CT Chest (5.15.2020) reveals no obvious PE, artifacts due to rods. Bivasilar atelectasis.\par \par

## 2020-05-18 NOTE — ED POST DISCHARGE NOTE - DETAILS
attempt to leave message for patient regarding result. Voicemail full, unable to leave message for patient. Will attempt x2 more times. -Mable Farnsworth PA-C Spoke to pt. Informed of results. Advised to follow up w/ her doctor. - Tonia Arnett PA-C

## 2020-05-18 NOTE — ADDENDUM
[FreeTextEntry1] : Documented by Anish Soriano acting as a scribe for Dr. Donn Lopez on 05/18/2020.\par \par All medical record entries made by the Scribe were at my, Dr. Donn Lopez's, direction and personally dictated by me on 05/18/2020. I have reviewed the chart and agree that the record accurately reflects my personal performance of the history, physical exam, assessment and plan. I have also personally directed, reviewed, and agree with the discharge instructions.

## 2020-05-23 ENCOUNTER — EMERGENCY (EMERGENCY)
Facility: HOSPITAL | Age: 40
LOS: 0 days | Discharge: HOME | End: 2020-05-23
Attending: EMERGENCY MEDICINE
Payer: COMMERCIAL

## 2020-05-23 VITALS
SYSTOLIC BLOOD PRESSURE: 120 MMHG | HEIGHT: 59 IN | DIASTOLIC BLOOD PRESSURE: 59 MMHG | RESPIRATION RATE: 16 BRPM | OXYGEN SATURATION: 96 % | TEMPERATURE: 98 F | WEIGHT: 138.89 LBS | HEART RATE: 90 BPM

## 2020-05-23 DIAGNOSIS — Z98.890 OTHER SPECIFIED POSTPROCEDURAL STATES: Chronic | ICD-10-CM

## 2020-05-23 DIAGNOSIS — R07.9 CHEST PAIN, UNSPECIFIED: ICD-10-CM

## 2020-05-23 DIAGNOSIS — Z98.89 OTHER SPECIFIED POSTPROCEDURAL STATES: Chronic | ICD-10-CM

## 2020-05-23 DIAGNOSIS — M06.9 RHEUMATOID ARTHRITIS, UNSPECIFIED: ICD-10-CM

## 2020-05-23 DIAGNOSIS — M41.9 SCOLIOSIS, UNSPECIFIED: ICD-10-CM

## 2020-05-23 DIAGNOSIS — Z86.711 PERSONAL HISTORY OF PULMONARY EMBOLISM: ICD-10-CM

## 2020-05-23 PROCEDURE — 93010 ELECTROCARDIOGRAM REPORT: CPT

## 2020-05-23 PROCEDURE — 99284 EMERGENCY DEPT VISIT MOD MDM: CPT

## 2020-05-23 NOTE — ED ADULT TRIAGE NOTE - NS ED TRIAGE AVPU SCALE
Right flank pain that radiates to right groin x 1+ weeks.  Was seen in ED 1 week ago for the same and discharged with ibuprofen that gave some relief but still continues to have pain.  Denies fever, intermittent nausea but no vomiting.  LBM today.  NO blood in stool or vomit, denies dysuria.  
Alert-The patient is alert, awake and responds to voice. The patient is oriented to time, place, and person. The triage nurse is able to obtain subjective information.

## 2020-05-23 NOTE — ED PROVIDER NOTE - CLINICAL SUMMARY MEDICAL DECISION MAKING FREE TEXT BOX
Pt with multiple visits in this state and in [place] has seen multiple specialist, does not want blood work just wants EKG and to go home since shes feeling better

## 2020-05-23 NOTE — ED PROVIDER NOTE - OBJECTIVE STATEMENT
39 y/o F with pmhx RA, scoliosis, hx of PE presents with sensation of chest heaviness for past x2 days stating she feels as If "small baby is on chest". Pt recently had a change in medication and is now on prednisone only. Pt had no pain up until tonight when she felt an uncomfortable sort of "spasm" in her heart that she does not feel is a palpitation. Pt states she hadn't felt like this before. Pt denies calf pain, calf edema, or SOB.

## 2020-05-23 NOTE — ED PROVIDER NOTE - MUSCULOSKELETAL, MLM
Spine appears normal, range of motion is not limited, no muscle or joint tenderness, no edema or calf tenderness.

## 2020-05-23 NOTE — ED ADULT NURSE NOTE - NSIMPLEMENTINTERV_GEN_ALL_ED
Complex Repair And V-Y Plasty Text: The defect edges were debeveled with a #15 scalpel blade.  The primary defect was closed partially with a complex linear closure.  Given the location of the remaining defect, shape of the defect and the proximity to free margins a V-Y plasty was deemed most appropriate for complete closure of the defect.  Using a sterile surgical marker, an appropriate advancement flap was drawn incorporating the defect and placing the expected incisions within the relaxed skin tension lines where possible.    The area thus outlined was incised deep to adipose tissue with a #15 scalpel blade.  The skin margins were undermined to an appropriate distance in all directions utilizing iris scissors. Implemented All Universal Safety Interventions:  [place] to call system. Call bell, personal items and telephone within reach. Instruct patient to call for assistance. Room bathroom lighting operational. Non-slip footwear when patient is off stretcher. Physically safe environment: no spills, clutter or unnecessary equipment. Stretcher in lowest position, wheels locked, appropriate side rails in place.

## 2020-05-23 NOTE — ED PROVIDER NOTE - PATIENT PORTAL LINK FT
You can access the FollowMyHealth Patient Portal offered by [facility] by registering at the following website: [email or web address]. By joining [employer]’s FollowMyHealth portal, you will also be able to view your health information using other applications (apps) compatible with our system.

## 2020-07-13 PROBLEM — M41.9 SCOLIOSIS, UNSPECIFIED: Chronic | Status: ACTIVE | Noted: 2020-05-17

## 2020-07-13 PROBLEM — J11.1 INFLUENZA DUE TO UNIDENTIFIED INFLUENZA VIRUS WITH OTHER RESPIRATORY MANIFESTATIONS: Chronic | Status: ACTIVE | Noted: 2020-05-17

## 2020-07-13 PROBLEM — I31.9 DISEASE OF PERICARDIUM, UNSPECIFIED: Chronic | Status: ACTIVE | Noted: 2020-05-17

## 2020-07-13 PROBLEM — M06.9 RHEUMATOID ARTHRITIS, UNSPECIFIED: Chronic | Status: ACTIVE | Noted: 2020-05-17

## 2020-07-14 ENCOUNTER — APPOINTMENT (OUTPATIENT)
Dept: PULMONOLOGY | Facility: CLINIC | Age: 40
End: 2020-07-14

## 2020-07-14 NOTE — ASSESSMENT
[FreeTextEntry1] : Ms. BILL is a 40 year old female with a history of RA, scoliosis, childhood chickenpox, non smoker, GERD, 45 days of respiratory Sx, misdiagnosed with PE who video calls into the office today for pulmonary evaluation\par \par The patient's shortness of breath is multifactorial due to:\par -pulmonary disease \par      -?Pleuropericarditis, ?asthma, ?ALYSSA/ Poor sleep, ?anemia\par -poor breathing mechanics \par -overweight/out of shape\par -?cardiac disease () ?pleuropericarditis (no evidence PE)\par \par \par Problem 1: ?Pleuropericarditis\par -complete ESR\par -Add Colchicine 0.6 mg BID\par -Add Motrin 400 mg qday\par \par Problem 2: ?Asthma\par - r/o pleuropericarditis first\par -Asthma is believed to be caused by inherited (genetic) and environmental factor, but its exact cause is unknown. Asthma may be triggered by allergens, lung infections, or irritants in the air. Asthma triggers are different for each person \par \par Problem 3:GERD\par -pantoprazole\par -Add Pepcid 40 mg QHS \par -Rule of 2s: avoid eating too much, eating too fast, eating too late, eating too spicy, eating too lousy, eating two hours before bed.\par -Things to avoid including overeating, spicy foods, tight clothing, eating within three hours of bed, this list is not all inclusive. \par -For treatment of reflux, possible options discussed including diet control, H2 blockers, PPIs, as well as coating motility agents discussed as treatment options. Timing of meals and proximity of last meal to sleep were discussed. If symptoms persist, a formal gastrointestinal evaluation is needed.\par \par Problem 4: Anemia\par -Complete Iron studies\par -Complete CBC\par \par Problem 5: ?ALYSSA (doubtful)/ Poor sleep\par -Recommended Sleep Hygiene\par -Complete CBC\par Sleep apnea is associated with adverse clinical consequences which an affect most organ systems. Cardiovascular disease risk includes arrhythmias, atrial fibrillation, hypertension, coronary artery disease, and stroke. Metabolic disorders include diabetes type 2, non-alcoholic fatty liver disease. Mood disorder especially depression; and cognitive decline especially in the elderly. Associations with chronic reflux/Minor’s esophagus some but not all inclusive. \par -Reasons include arousal consistent with hypopnea; respiratory events most prominent in REM sleep or supine position; therefore sleep staging and body position are important for accurate diagnosis and estimation of AHI. \par \par Problem 6: Poor Mechanics of Breathing\par - Proper breathing techniques were reviewed with an emphasis of exhalation. Patient instructed to breath in for 1 second and out for four seconds. Patient was encouraged to not talk while walking. \par \par Problem 7: Overweight\par -Weight loss, exercise, and diet control were discussed and are highly encouraged. Treatment options were given such as, aqua therapy, and contacting a nutritionist. Recommended to use the elliptical, stationary bike, less use of treadmill. Mindful eating was explained to the patient Obesity is associated with worsening asthma, shortness of breath, and potential for cardiac disease, diabetes, and other underlying medical conditions. \par \par Problem 8: Cardiac (Dr. Lopez) -r/o Pleuropericarditis\par -recommended to follow up with a cardiologist\par -complete ESR\par -Add Colchicine 0.6 mg BID\par -Add Motrin 400 mg qday\par \par problem 9: Health Maintenance/COVID19 Precautions:\par - Clean your hands often. Wash your hands often with soap and water for at least 20 seconds, especially after blowing your nose, coughing, or sneezing, or having been in a public place.\par - If soap and water are not available, use a hand  that contains at least 60% alcohol.\par - To the extent possible, avoid touching high-touch surfaces in public places - elevator buttons, door handles, handrails, handshaking with people, etc. Use a tissue or your sleeve to cover your hand or finger if you must touch something.\par - Wash your hands after touching surfaces in public places.\par - Avoid touching your face, nose, eyes, etc.\par - Clean and disinfect your home to remove germs: practice routine cleaning of frequently touched surfaces (for example: tables, doorknobs, light switches, handles, desks, toilets, faucets, sinks & cell phones)\par - Avoid crowds, especially in poorly ventilated spaces. Your risk of exposure to respiratory viruses like COVID-19 may increase in crowded, closed-in settings with little air circulation if there are people in the crowd who are sick. All patients are recommended to practice social distancing and stay at least 6 feet away from others.\par - Avoid all non-essential travel including plane trips, and especially avoid embarking on cruise ships.\par -If COVID-19 is spreading in your community, take extra measures to put distance between yourself and other people to further reduce your risk of being exposed to this new virus.\par -Stay home as much as possible.\par - Consider ways of getting food brought to your house through family, social, or commercial networks\par -Be aware that the virus has been known to live in the air up to 3 hours post exposure, cardboard up to 24 hours post exposure, copper up to 4 hours post exposure, steel and plastic up to 2-3 days post exposure. Risk of transmission from these surfaces are affected by many variables.\par Immune Support Recommendations:\par -OTC Vitamin C 500mg BID \par -OTC Quercetin 250-500mg BID \par -OTC Zinc 75-100mg per day \par -OTC Melatonin 1 or 2 mg a night \par -OTC Vitamin D 1-4000mg per day \par -OTC Tonic Water 8oz per day\par Asthma and COVID19:\par You need to make sure your asthma is under control. This often requires the use of inhaled corticosteroids (and sometimes oral corticosteroids). Inhaled corticosteroids do not likely reduce your immune system’s ability to fight infections, but oral corticosteroids may. It is important to use the steps above to protect yourself to limit your exposure to any respiratory virus. \par \par Problem 10: health maintenance\par -s/p influenza vaccine\par -recommended strep pneumonia vaccines after age 65: Prevnar-13 vaccine, followed by Pneumo vaccine 23 one year following\par -recommended early intervention for URIs\par -recommended regular osteoporosis evaluations\par -recommended early dermatological evaluations\par -recommended after the age of 50 to the age of 70, colonoscopy every 5 years \par \par  Follow up in 6-8 weeks\par -he  is recommended to call with any changes, questions, or concerns.

## 2020-07-14 NOTE — REASON FOR VISIT
[Initial] : an initial visit [TextBox_44] : video call- ?Pleuropericarditis, ?asthma, ?ALYSSA/ Poor sleep, ?anemia

## 2020-07-14 NOTE — HISTORY OF PRESENT ILLNESS
[TextBox_4] : Ms. BILL is a 40 year old female presenting to the office today for pulmonary evaluation. Her chief complaint is\par \par -she notes fever in late march\par -she notes positive COVID test early April\par -she notes palpitations constant since April\par -she notes Antibody tests negative\par -she notes reflux\par -she notes SOB\par -she notes cough to get deeper breath\par -she notes clear phlegm production\par -she notes aiming to drink more water to get properly hydrated\par -she notes sinuses not drippy or congested\par -she notes weight stable\par -she notes aiming to lose 10 lbs\par -she notes sleep is variable\par -she notes some nights not restful\par -she denies snoring\par -She notes Her bowels are regular \par -she notes vision is good\par -she notes nocturia 1 time per night\par -she notes [facility] [place] claim PE and treated with Elequis\par -she notes follow up CT in [place], reporting no PE\par -she notes [facility] saw clots and compared to [facility] and think rods mistaken for clots\par -she notes sees cardiologist \par -she notes visit [facility] suspect pericarditis and given Rx of Aspirin for 30 days\par -she notes night sweats past two nights\par -she notes warm to palpation, but no fever\par -she notes no menstrual cycle/ doesn't get period \par \par -she denies any headaches, nausea, vomiting, fever, chills, chest pain, chest pressure, diarrhea, constipation, dysphagia, dizziness, leg swelling, leg pain, itchy eyes, itchy ears, sour taste in the mouth, myalgias or arthralgias.

## 2020-07-14 NOTE — HISTORY OF PRESENT ILLNESS
[Home] : at home, [unfilled] , at the time of the visit. [Medical Office: ([facility])___] : at the medical office located in  [Verbal consent obtained from patient] : the patient, [unfilled] [TextBox_4] : Ms. BILL is a 40 year old female video calling to the office today for pulmonary follow up .Her chief complaint is\par \par -she denies any headaches, nausea, vomiting, fever, chills, sweats, chest pain, chest pressure, diarrhea, constipation, dysphagia, dizziness, sour taste in the mouth, leg swelling, leg pain, itchy eyes, itchy ears, heartburn, reflux, myalgias or arthralgias.

## 2020-07-14 NOTE — PHYSICAL EXAM
[No Acute Distress] : no acute distress [No Deformities] : no deformities [Well Developed] : well developed [Well Nourished] : well nourished

## 2020-07-14 NOTE — ADDENDUM
[FreeTextEntry1] : Documented by Anish Soriano acting as a scribe for Dr. Donn Lopez on 07/14/2020.\par \par All medical record entries made by the Scribe were at my, Dr. Donn Lopez's, direction and personally dictated by me on 07/14/2020. I have reviewed the chart and agree that the record accurately reflects my personal performance of the history, physical exam, assessment and plan. I have also personally directed, reviewed, and agree with the discharge instructions.

## 2020-07-20 ENCOUNTER — APPOINTMENT (OUTPATIENT)
Dept: PULMONOLOGY | Facility: CLINIC | Age: 40
End: 2020-07-20

## 2020-07-21 ENCOUNTER — APPOINTMENT (OUTPATIENT)
Dept: PULMONOLOGY | Facility: CLINIC | Age: 40
End: 2020-07-21

## 2020-07-21 NOTE — HISTORY OF PRESENT ILLNESS
[Home] : at home, [unfilled] , at the time of the visit. [Medical Office: ([facility])___] : at the medical office located in  [Verbal consent obtained from patient] : the patient, [unfilled] [TextBox_4] : Ms. BILL is a 40 year old female video calling to the office today for pulmonary follow up. Her chief complaint is\par \par \par \par -she denies any headaches, nausea, vomiting, fever, chills, sweats, chest pain, chest pressure, diarrhea, constipation, dysphagia, dizziness, sour taste in the mouth, leg swelling, leg pain, itchy eyes, itchy ears, heartburn, reflux, myalgias or arthralgias.

## 2020-07-21 NOTE — ADDENDUM
[FreeTextEntry1] : Documented by Anish Soriano acting as a scribe for Dr. Donn Lopez on 07/21/2020.\par \par All medical record entries made by the Scribe were at my, Dr. Donn Lopez's, direction and personally dictated by me on 07/21/2020. I have reviewed the chart and agree that the record accurately reflects my personal performance of the history, physical exam, assessment and plan. I have also personally directed, reviewed, and agree with the discharge instructions.

## 2020-09-30 ENCOUNTER — APPOINTMENT (OUTPATIENT)
Dept: PULMONOLOGY | Facility: CLINIC | Age: 40
End: 2020-09-30

## 2020-11-25 ENCOUNTER — TRANSCRIPTION ENCOUNTER (OUTPATIENT)
Age: 40
End: 2020-11-25

## 2020-11-27 ENCOUNTER — EMERGENCY (EMERGENCY)
Facility: HOSPITAL | Age: 40
LOS: 0 days | Discharge: LEFT BEFORE DISCHARGE | End: 2020-11-27
Attending: EMERGENCY MEDICINE
Payer: COMMERCIAL

## 2020-11-27 VITALS
WEIGHT: 143.96 LBS | DIASTOLIC BLOOD PRESSURE: 91 MMHG | SYSTOLIC BLOOD PRESSURE: 143 MMHG | HEIGHT: 59 IN | HEART RATE: 88 BPM | OXYGEN SATURATION: 100 % | TEMPERATURE: 98 F | RESPIRATION RATE: 19 BRPM

## 2020-11-27 VITALS
OXYGEN SATURATION: 98 % | SYSTOLIC BLOOD PRESSURE: 125 MMHG | DIASTOLIC BLOOD PRESSURE: 88 MMHG | RESPIRATION RATE: 18 BRPM | HEART RATE: 97 BPM

## 2020-11-27 DIAGNOSIS — R00.2 PALPITATIONS: ICD-10-CM

## 2020-11-27 DIAGNOSIS — Z98.890 OTHER SPECIFIED POSTPROCEDURAL STATES: Chronic | ICD-10-CM

## 2020-11-27 DIAGNOSIS — Z79.82 LONG TERM (CURRENT) USE OF ASPIRIN: ICD-10-CM

## 2020-11-27 DIAGNOSIS — R11.0 NAUSEA: ICD-10-CM

## 2020-11-27 DIAGNOSIS — R42 DIZZINESS AND GIDDINESS: ICD-10-CM

## 2020-11-27 DIAGNOSIS — Z98.89 OTHER SPECIFIED POSTPROCEDURAL STATES: Chronic | ICD-10-CM

## 2020-11-27 DIAGNOSIS — R06.02 SHORTNESS OF BREATH: ICD-10-CM

## 2020-11-27 DIAGNOSIS — M06.9 RHEUMATOID ARTHRITIS, UNSPECIFIED: ICD-10-CM

## 2020-11-27 DIAGNOSIS — M41.9 SCOLIOSIS, UNSPECIFIED: ICD-10-CM

## 2020-11-27 LAB
ALBUMIN SERPL ELPH-MCNC: 3.5 G/DL — SIGNIFICANT CHANGE UP (ref 3.3–5)
ALP SERPL-CCNC: 66 U/L — SIGNIFICANT CHANGE UP (ref 40–120)
ALT FLD-CCNC: 35 U/L — SIGNIFICANT CHANGE UP (ref 12–78)
ANION GAP SERPL CALC-SCNC: 5 MMOL/L — SIGNIFICANT CHANGE UP (ref 5–17)
AST SERPL-CCNC: 26 U/L — SIGNIFICANT CHANGE UP (ref 15–37)
BASOPHILS # BLD AUTO: 0.03 K/UL — SIGNIFICANT CHANGE UP (ref 0–0.2)
BASOPHILS NFR BLD AUTO: 0.6 % — SIGNIFICANT CHANGE UP (ref 0–2)
BILIRUB SERPL-MCNC: 0.3 MG/DL — SIGNIFICANT CHANGE UP (ref 0.2–1.2)
BUN SERPL-MCNC: 8 MG/DL — SIGNIFICANT CHANGE UP (ref 7–23)
CALCIUM SERPL-MCNC: 9.2 MG/DL — SIGNIFICANT CHANGE UP (ref 8.5–10.1)
CHLORIDE SERPL-SCNC: 106 MMOL/L — SIGNIFICANT CHANGE UP (ref 96–108)
CO2 SERPL-SCNC: 29 MMOL/L — SIGNIFICANT CHANGE UP (ref 22–31)
CREAT SERPL-MCNC: 0.66 MG/DL — SIGNIFICANT CHANGE UP (ref 0.5–1.3)
EOSINOPHIL # BLD AUTO: 0.22 K/UL — SIGNIFICANT CHANGE UP (ref 0–0.5)
EOSINOPHIL NFR BLD AUTO: 4.6 % — SIGNIFICANT CHANGE UP (ref 0–6)
GLUCOSE SERPL-MCNC: 90 MG/DL — SIGNIFICANT CHANGE UP (ref 70–99)
HCG SERPL-ACNC: 1 MIU/ML — SIGNIFICANT CHANGE UP
HCT VFR BLD CALC: 36.1 % — SIGNIFICANT CHANGE UP (ref 34.5–45)
HGB BLD-MCNC: 12 G/DL — SIGNIFICANT CHANGE UP (ref 11.5–15.5)
IMM GRANULOCYTES NFR BLD AUTO: 0.2 % — SIGNIFICANT CHANGE UP (ref 0–1.5)
LYMPHOCYTES # BLD AUTO: 1.28 K/UL — SIGNIFICANT CHANGE UP (ref 1–3.3)
LYMPHOCYTES # BLD AUTO: 26.7 % — SIGNIFICANT CHANGE UP (ref 13–44)
MAGNESIUM SERPL-MCNC: 2.1 MG/DL — SIGNIFICANT CHANGE UP (ref 1.6–2.6)
MCHC RBC-ENTMCNC: 29.3 PG — SIGNIFICANT CHANGE UP (ref 27–34)
MCHC RBC-ENTMCNC: 33.2 GM/DL — SIGNIFICANT CHANGE UP (ref 32–36)
MCV RBC AUTO: 88.3 FL — SIGNIFICANT CHANGE UP (ref 80–100)
MONOCYTES # BLD AUTO: 0.51 K/UL — SIGNIFICANT CHANGE UP (ref 0–0.9)
MONOCYTES NFR BLD AUTO: 10.6 % — SIGNIFICANT CHANGE UP (ref 2–14)
NEUTROPHILS # BLD AUTO: 2.74 K/UL — SIGNIFICANT CHANGE UP (ref 1.8–7.4)
NEUTROPHILS NFR BLD AUTO: 57.3 % — SIGNIFICANT CHANGE UP (ref 43–77)
NRBC # BLD: 0 /100 WBCS — SIGNIFICANT CHANGE UP (ref 0–0)
PLATELET # BLD AUTO: 284 K/UL — SIGNIFICANT CHANGE UP (ref 150–400)
POTASSIUM SERPL-MCNC: 3.3 MMOL/L — LOW (ref 3.5–5.3)
POTASSIUM SERPL-SCNC: 3.3 MMOL/L — LOW (ref 3.5–5.3)
PROT SERPL-MCNC: 7.9 GM/DL — SIGNIFICANT CHANGE UP (ref 6–8.3)
RBC # BLD: 4.09 M/UL — SIGNIFICANT CHANGE UP (ref 3.8–5.2)
RBC # FLD: 13.7 % — SIGNIFICANT CHANGE UP (ref 10.3–14.5)
SODIUM SERPL-SCNC: 140 MMOL/L — SIGNIFICANT CHANGE UP (ref 135–145)
TROPONIN I SERPL-MCNC: <.015 NG/ML — SIGNIFICANT CHANGE UP (ref 0.01–0.04)
WBC # BLD: 4.79 K/UL — SIGNIFICANT CHANGE UP (ref 3.8–10.5)
WBC # FLD AUTO: 4.79 K/UL — SIGNIFICANT CHANGE UP (ref 3.8–10.5)

## 2020-11-27 PROCEDURE — 93010 ELECTROCARDIOGRAM REPORT: CPT

## 2020-11-27 PROCEDURE — 99285 EMERGENCY DEPT VISIT HI MDM: CPT

## 2020-11-27 PROCEDURE — 70450 CT HEAD/BRAIN W/O DYE: CPT | Mod: 26,MH

## 2020-11-27 PROCEDURE — 71045 X-RAY EXAM CHEST 1 VIEW: CPT | Mod: 26

## 2020-11-27 RX ORDER — POTASSIUM CHLORIDE 20 MEQ
40 PACKET (EA) ORAL ONCE
Refills: 0 | Status: COMPLETED | OUTPATIENT
Start: 2020-11-27 | End: 2020-11-27

## 2020-11-27 RX ORDER — ONDANSETRON 8 MG/1
4 TABLET, FILM COATED ORAL ONCE
Refills: 0 | Status: COMPLETED | OUTPATIENT
Start: 2020-11-27 | End: 2020-11-27

## 2020-11-27 RX ORDER — MECLIZINE HCL 12.5 MG
25 TABLET ORAL ONCE
Refills: 0 | Status: COMPLETED | OUTPATIENT
Start: 2020-11-27 | End: 2020-11-27

## 2020-11-27 RX ORDER — SODIUM CHLORIDE 9 MG/ML
1000 INJECTION INTRAMUSCULAR; INTRAVENOUS; SUBCUTANEOUS ONCE
Refills: 0 | Status: COMPLETED | OUTPATIENT
Start: 2020-11-27 | End: 2020-11-27

## 2020-11-27 RX ADMIN — SODIUM CHLORIDE 1000 MILLILITER(S): 9 INJECTION INTRAMUSCULAR; INTRAVENOUS; SUBCUTANEOUS at 19:27

## 2020-11-27 RX ADMIN — Medication 25 MILLIGRAM(S): at 19:04

## 2020-11-27 RX ADMIN — SODIUM CHLORIDE 1000 MILLILITER(S): 9 INJECTION INTRAMUSCULAR; INTRAVENOUS; SUBCUTANEOUS at 19:05

## 2020-11-27 RX ADMIN — ONDANSETRON 4 MILLIGRAM(S): 8 TABLET, FILM COATED ORAL at 19:04

## 2020-11-27 RX ADMIN — SODIUM CHLORIDE 1000 MILLILITER(S): 9 INJECTION INTRAMUSCULAR; INTRAVENOUS; SUBCUTANEOUS at 17:10

## 2020-11-27 RX ADMIN — SODIUM CHLORIDE 1000 MILLILITER(S): 9 INJECTION INTRAMUSCULAR; INTRAVENOUS; SUBCUTANEOUS at 19:47

## 2020-11-27 RX ADMIN — Medication 40 MILLIEQUIVALENT(S): at 19:04

## 2020-11-27 NOTE — ED PROVIDER NOTE - CLINICAL SUMMARY MEDICAL DECISION MAKING FREE TEXT BOX
Lab values do not require emergent intervention. Pt well appearing, orthostatic by HR, clinically improved after IVF. pending CTH and repeat Ce and monitoring. Patient signed out to incoming physician.  All decisions regarding the progression of care will be made at their discretion.

## 2020-11-27 NOTE — ED ADULT NURSE NOTE - CHIEF COMPLAINT QUOTE
patient c/o of dizziness, difficulty breathing and lightheaded , c/o of chest discomfort started 1 hour ago, denied fever no cough, history of pericarditis

## 2020-11-27 NOTE — ED ADULT TRIAGE NOTE - CHIEF COMPLAINT QUOTE
patient c/o of dizziness, difficulty breathing and lightheaded , c/o of chest discomfort started 1 hour ago, denied fever no cough patient c/o of dizziness, difficulty breathing and lightheaded , c/o of chest discomfort started 1 hour ago, denied fever no cough, history of pericarditis

## 2020-11-27 NOTE — ED ADULT NURSE NOTE - PSH
History of back surgery  Rods in back for scolosis  Personal history of spine surgery  1993 lizzie in back/surg for tx of scoliosis

## 2020-11-27 NOTE — ED ADULT NURSE NOTE - CAS ED AMA REASON YN
Yes/pt does not want to stay any longer in the ED, she said she will follow-up with her pcp and cardiologist

## 2020-11-27 NOTE — ED ADULT NURSE NOTE - OBJECTIVE STATEMENT
Pt c/o feeling dizziness and sob 45mins prior to ED arrival.  Pt denies change in vision, HA, chest pain, abd pain, nausea, diarrhea, dysuria, or temperature intolerances.

## 2020-11-27 NOTE — ED ADULT NURSE NOTE - PMH
Arthritis, rheumatoid    Flu  diagnosed with the flu 4/19/2020  Pericarditis    Pulmonary embolism    Rheumatoid arthritis    Scoliosis    Scoliosis

## 2020-11-27 NOTE — ED PROVIDER NOTE - PROGRESS NOTE DETAILS
ambulated to bathroom without dizziness Results reported to patient--grossly benign, labs wnl, CT wnl, XR clear  Pt. reports feeling better after meds  pt. agrees to f/u with primary care outpt., referred to cardio for f/u as well   pt. understands to return to ED if symptoms worsen; will d/c AMA as pt. does not want to stay for repeat troponin The patient has decided to leave against medical advice.  The patient is AAOx3, not intoxicated, and displays normal decision making ability. We discussed all risks, benefits, and alternatives to the progression of treatment and the potential dangers of leaving including but not limited to permanent disability, injury, and death.  The patient was instructed that they are welcome to change their decision to leave against medical advice and return to the emergency department at any time and for any reason in order to allow us to render care.

## 2020-11-27 NOTE — ED PROVIDER NOTE - OBJECTIVE STATEMENT
39 y/o F with pmhx RA on MTx, scoliosis, presents with lightheadedness and nausea.  Of note, pt also reports ss chest inflammation x 2 days. Pt with  chronic palpitations. Of note, pt seen at [facility] for the "chest inflammation/swelling" sensation 2 days ago, had elev ddimer and CTA, states artifact, unlikely any PE. Pt reports that pain has not worsened. Pt reports no h/o PE, however has been inaccurately diagnosed given her artificat from rods from scoliosis. No leg swelling, cardiac history. Pt reports she took prednisone and her chest inflamation improved. Denies chest pain or sob. denies fever, cough, headache, vomiting, abd pain.     No fever/chills, No photophobia/eye pain/changes in vision, No ear pain/sore throat/dysphagia, No chest pain/palpitations, no SOB/cough, no wheeze/stridor, No abdominal pain, No V/D, no dysuria/frequency/discharge, No neck/back pain, no rash, + lightheadedness

## 2020-11-27 NOTE — ED PROVIDER NOTE - PATIENT PORTAL LINK FT
You can access the FollowMyHealth Patient Portal offered by [facility] by registering at the following website: [email or web address]. By joining [facility]’s FollowMyHealth portal, you will also be able to view your health information using other applications (apps) compatible with our system.

## 2020-12-08 ENCOUNTER — TRANSCRIPTION ENCOUNTER (OUTPATIENT)
Age: 40
End: 2020-12-08

## 2021-01-12 ENCOUNTER — APPOINTMENT (OUTPATIENT)
Dept: PULMONOLOGY | Facility: CLINIC | Age: 41
End: 2021-01-12

## 2021-03-15 ENCOUNTER — RESULT REVIEW (OUTPATIENT)
Age: 41
End: 2021-03-15

## 2021-03-18 ENCOUNTER — APPOINTMENT (OUTPATIENT)
Dept: PULMONOLOGY | Facility: CLINIC | Age: 41
End: 2021-03-18
Payer: COMMERCIAL

## 2021-03-18 VITALS
BODY MASS INDEX: 27.19 KG/M2 | OXYGEN SATURATION: 97 % | WEIGHT: 144 LBS | SYSTOLIC BLOOD PRESSURE: 114 MMHG | TEMPERATURE: 97.4 F | DIASTOLIC BLOOD PRESSURE: 76 MMHG | HEART RATE: 112 BPM | RESPIRATION RATE: 16 BRPM | HEIGHT: 61 IN

## 2021-03-18 DIAGNOSIS — B33.23 VIRAL PERICARDITIS: ICD-10-CM

## 2021-03-18 DIAGNOSIS — Z01.812 ENCOUNTER FOR PREPROCEDURAL LABORATORY EXAMINATION: ICD-10-CM

## 2021-03-18 PROCEDURE — 99214 OFFICE O/P EST MOD 30 MIN: CPT | Mod: 25

## 2021-03-18 PROCEDURE — 99072 ADDL SUPL MATRL&STAF TM PHE: CPT

## 2021-03-18 PROCEDURE — 71046 X-RAY EXAM CHEST 2 VIEWS: CPT

## 2021-03-18 NOTE — PROCEDURE
[FreeTextEntry1] : CXR revealed amild cardiomegaly ; there was no evidence of infiltrate or effusion --- Neo rods in place

## 2021-03-18 NOTE — ASSESSMENT
[FreeTextEntry1] : Ms. BILL is a 40 year old female with a history of RA, scoliosis, childhood chickenpox, non smoker, GERD, 45 days of respiratory Sx, misdiagnosed with PE who video calls into the office today for pulmonary evaluation s/p pleural pericarditis 2020- Now Sx c/w asthma\par \par The patient's shortness of breath is multifactorial due to:\par -pulmonary disease \par      -?Pleuropericarditis, ?asthma, ?ALYSSA/ Poor sleep, ?anemia\par -poor breathing mechanics \par -overweight/out of shape\par -?cardiac disease () ?pleuropericarditis (no evidence PE)\par \par \par Problem 1: ?Pleuropericarditis (resolved)\par -complete ESR\par -Add Colchicine 0.6 mg BID\par -Add Motrin 400 mg qday\par \par Problem 2: ?Asthma\par - add Breo Ellipta 200 at 1 inhalation QHS QD\par -Complete Full PFTs\par -Asthma is believed to be caused by inherited (genetic) and environmental factor, but its exact cause is unknown. Asthma may be triggered by allergens, lung infections, or irritants in the air. Asthma triggers are different for each person \par \par Problem 3:GERD\par -pantoprazole\par -Add Pepcid 40 mg QHS \par -Rule of 2s: avoid eating too much, eating too fast, eating too late, eating too spicy, eating too lousy, eating two hours before bed.\par -Things to avoid including overeating, spicy foods, tight clothing, eating within three hours of bed, this list is not all inclusive. \par -For treatment of reflux, possible options discussed including diet control, H2 blockers, PPIs, as well as coating motility agents discussed as treatment options. Timing of meals and proximity of last meal to sleep were discussed. If symptoms persist, a formal gastrointestinal evaluation is needed.\par \par Problem 4: Anemia\par -Complete Iron studies (elevated Ferritin) \par -Complete CBC (WNL)\par \par Problem 5: ?ALYSSA (doubtful)/ Poor sleep\par -Recommended Sleep Hygiene\par Sleep apnea is associated with adverse clinical consequences which an affect most organ systems. Cardiovascular disease risk includes arrhythmias, atrial fibrillation, hypertension, coronary artery disease, and stroke. Metabolic disorders include diabetes type 2, non-alcoholic fatty liver disease. Mood disorder especially depression; and cognitive decline especially in the elderly. Associations with chronic reflux/Minor’s esophagus some but not all inclusive. \par -Reasons include arousal consistent with hypopnea; respiratory events most prominent in REM sleep or supine position; therefore sleep staging and body position are important for accurate diagnosis and estimation of AHI. \par \par Problem 6: Poor Mechanics of Breathing\par - Proper breathing techniques were reviewed with an emphasis of exhalation. Patient instructed to breath in for 1 second and out for four seconds. Patient was encouraged to not talk while walking. \par \par Problem 7: Overweight\par -Weight loss, exercise, and diet control were discussed and are highly encouraged. Treatment options were given such as, aqua therapy, and contacting a nutritionist. Recommended to use the elliptical, stationary bike, less use of treadmill. Mindful eating was explained to the patient Obesity is associated with worsening asthma, shortness of breath, and potential for cardiac disease, diabetes, and other underlying medical conditions. \par \par Problem 8: Cardiac (Dr. Lopez) -r/o Pleuropericarditis (resolved)\par -recommended to follow up with a cardiologist\par -s/p ESR 76\par -s/p Colchicine 0.6 mg BID (completed)\par -s/p Motrin 400 mg qday (completed)\par \par problem 9: Health Maintenance/COVID19 Precautions:\par -covid 19 vaccine pending \par - Clean your hands often. Wash your hands often with soap and water for at least 20 seconds, especially after blowing your nose, coughing, or sneezing, or having been in a public place.\par - If soap and water are not available, use a hand  that contains at least 60% alcohol.\par - To the extent possible, avoid touching high-touch surfaces in public places - elevator buttons, door handles, handrails, handshaking with people, etc. Use a tissue or your sleeve to cover your hand or finger if you must touch something.\par - Wash your hands after touching surfaces in public places.\par - Avoid touching your face, nose, eyes, etc.\par - Clean and disinfect your home to remove germs: practice routine cleaning of frequently touched surfaces (for example: tables, doorknobs, light switches, handles, desks, toilets, faucets, sinks & cell phones)\par - Avoid crowds, especially in poorly ventilated spaces. Your risk of exposure to respiratory viruses like COVID-19 may increase in crowded, closed-in settings with little air circulation if there are people in the crowd who are sick. All patients are recommended to practice social distancing and stay at least 6 feet away from others.\par - Avoid all non-essential travel including plane trips, and especially avoid embarking on cruise ships.\par -If COVID-19 is spreading in your community, take extra measures to put distance between yourself and other people to further reduce your risk of being exposed to this new virus.\par -Stay home as much as possible.\par - Consider ways of getting food brought to your house through family, social, or commercial networks\par -Be aware that the virus has been known to live in the air up to 3 hours post exposure, cardboard up to 24 hours post exposure, copper up to 4 hours post exposure, steel and plastic up to 2-3 days post exposure. Risk of transmission from these surfaces are affected by many variables.\par Immune Support Recommendations:\par -OTC Vitamin C 500mg BID \par -OTC Quercetin 250-500mg BID \par -OTC Zinc 75-100mg per day \par -OTC Melatonin 1 or 2 mg a night \par -OTC Vitamin D 1-4000mg per day \par -OTC Tonic Water 8oz per day\par Asthma and COVID19:\par You need to make sure your asthma is under control. This often requires the use of inhaled corticosteroids (and sometimes oral corticosteroids). Inhaled corticosteroids do not likely reduce your immune system’s ability to fight infections, but oral corticosteroids may. It is important to use the steps above to protect yourself to limit your exposure to any respiratory virus. \par \par Problem 10: health maintenance\par -s/p influenza vaccine\par -recommended strep pneumonia vaccines after age 65: Prevnar-13 vaccine, followed by Pneumo vaccine 23 one year following\par -recommended early intervention for URIs\par -recommended regular osteoporosis evaluations\par -recommended early dermatological evaluations\par -recommended after the age of 50 to the age of 70, colonoscopy every 5 years \par \par  Follow up in 6-8 weeks\par -he  is recommended to call with any changes, questions, or concerns.

## 2021-03-18 NOTE — HISTORY OF PRESENT ILLNESS
[TextBox_4] : Ms. BILL is a 40 year old female presenting to the office today for pulmonary evaluation. Her chief complaint is\par -She notes feeling well in general\par -She notes difficulty taking a  deep breath, occurring 2-3x per month \par -She notes these sx worsen when she gets hungry\par -She notes her vision is getting blurry\par -she denies hoarseness\par -She notes some wheezing, and slight cough\par -She notes her weight is going up\par -She denies changing her diet \par -She denies exercising \par -She notes walking for exercise\par -She notes being in [place] for 3 months for work \par -She notes snoring once in a while\par -She notes feeling rested in the morning \par -She notes still taking methotrexate once a week\par -\par \par - patient denies any headaches, nausea, vomiting, fever, chills, sweats, chest pain, chest pressure, palpitations,coughing, wheezing, fatigue, diarrhea, constipation, dysphagia, myalgias, dizziness, leg swelling, leg pain, itchy eyes, itchy ears, heartburn, reflux or sour taste in the mouth

## 2021-03-18 NOTE — ADDENDUM
[FreeTextEntry1] : Documented by Fredy Moore acting as a scribe for Dr. Donn Lopez on (03/18/2021).\par \par All medical record entries made by the Scribe were at my, Dr. Donn Lopez's, direction and personally dictated by me on (03/18/2021). I have reviewed the chart and agree that the record accurately reflects my personal performance of the history, physical exam, assessment and plan. I have also personally directed, reviewed, and agree with the discharge instructions.\par

## 2021-03-18 NOTE — REASON FOR VISIT
[Follow-Up] : a follow-up visit [TextBox_44] : ?Pleuropericarditis, ?asthma, ?ALYSSA/ Poor sleep, ?anemia

## 2021-06-02 PROBLEM — Z01.812 PRE-PROCEDURAL LABORATORY EXAMINATION: Status: ACTIVE | Noted: 2021-06-02

## 2021-06-16 ENCOUNTER — APPOINTMENT (OUTPATIENT)
Dept: PULMONOLOGY | Facility: CLINIC | Age: 41
End: 2021-06-16
Payer: COMMERCIAL

## 2021-06-16 VITALS
DIASTOLIC BLOOD PRESSURE: 76 MMHG | WEIGHT: 143 LBS | BODY MASS INDEX: 27 KG/M2 | TEMPERATURE: 97.5 F | HEIGHT: 61 IN | OXYGEN SATURATION: 98 % | SYSTOLIC BLOOD PRESSURE: 120 MMHG | HEART RATE: 102 BPM | RESPIRATION RATE: 16 BRPM

## 2021-06-16 DIAGNOSIS — Z71.89 OTHER SPECIFIED COUNSELING: ICD-10-CM

## 2021-06-16 DIAGNOSIS — Z72.820 SLEEP DEPRIVATION: ICD-10-CM

## 2021-06-16 PROCEDURE — ZZZZZ: CPT

## 2021-06-16 PROCEDURE — 94618 PULMONARY STRESS TESTING: CPT

## 2021-06-16 PROCEDURE — 99214 OFFICE O/P EST MOD 30 MIN: CPT | Mod: 25

## 2021-06-16 NOTE — ADDENDUM
[FreeTextEntry1] : Documented by Jesse Smith acting as a scribe for Dr. Donn Lopez on 06/16/2021.\par \par All medical record entries made by the Scribe were at my, Dr. Donn Lopez's, direction and personally dictated by me on 06/16/2021. I have reviewed the chart and agree that the record accurately reflects my personal performance of the history, physical exam, assessment and plan. I have also personally directed, reviewed, and agree with the discharge instructions.

## 2021-06-16 NOTE — PHYSICAL EXAM
[No Acute Distress] : no acute distress [Well Nourished] : well nourished [No Deformities] : no deformities [Well Developed] : well developed [Normal Oropharynx] : normal oropharynx [III] : Mallampati Class: III [Normal Appearance] : normal appearance [No Neck Mass] : no neck mass [Normal Rate/Rhythm] : normal rate/rhythm [Normal S1, S2] : normal s1, s2 [No Murmurs] : no murmurs [No Resp Distress] : no resp distress [Clear to Auscultation Bilaterally] : clear to auscultation bilaterally [No Abnormalities] : no abnormalities [Benign] : benign [Normal Gait] : normal gait [No Clubbing] : no clubbing [No Cyanosis] : no cyanosis [No Edema] : no edema [FROM] : FROM [Normal Color/ Pigmentation] : normal color/ pigmentation [No Focal Deficits] : no focal deficits [Oriented x3] : oriented x3 [Normal Affect] : normal affect [TextBox_68] : I:E ratio 1:3; clear

## 2021-06-16 NOTE — ASSESSMENT
[FreeTextEntry1] : Ms. BILL is a 41 year old female with a history of RA, scoliosis, childhood chickenpox, non smoker, GERD, 45 days of respiratory Sx, misdiagnosed with PE who presents to the office today for pulmonary evaluation s/p pleural pericarditis 2020- s/p Sx c/w asthma 3/2021 - now stable\par \par The patient's shortness of breath is multifactorial due to:\par -pulmonary disease \par      -?Pleuropericarditis, ?asthma, ?ALYSSA/ Poor sleep, ?anemia\par -poor breathing mechanics \par -overweight/out of shape\par -?cardiac disease () ?pleuropericarditis (no evidence PE)\par \par \par Problem 1: ?Pleuropericarditis (resolved)\par -complete ESR\par -Add Colchicine 0.6 mg BID\par -Add Motrin 400 mg qday\par \par Problem 2: ?Asthma (possible)\par -continue Breo Ellipta 200 at 1 inhalation QHS QD\par -Complete Full PFTs - pending\par -Asthma is believed to be caused by inherited (genetic) and environmental factor, but its exact cause is unknown. Asthma may be triggered by allergens, lung infections, or irritants in the air. Asthma triggers are different for each person \par \par Problem 3:GERD\par -pantoprazole\par -Add Pepcid 40 mg QHS \par -Rule of 2s: avoid eating too much, eating too fast, eating too late, eating too spicy, eating too lousy, eating two hours before bed.\par -Things to avoid including overeating, spicy foods, tight clothing, eating within three hours of bed, this list is not all inclusive. \par -For treatment of reflux, possible options discussed including diet control, H2 blockers, PPIs, as well as coating motility agents discussed as treatment options. Timing of meals and proximity of last meal to sleep were discussed. If symptoms persist, a formal gastrointestinal evaluation is needed.\par \par Problem 4: Anemia\par -Complete Iron studies (elevated Ferritin) \par -Complete CBC (WNL)\par \par Problem 5: ?ALYSSA (doubtful)/ Poor sleep\par -Recommended Sleep Hygiene\par Sleep apnea is associated with adverse clinical consequences which an affect most organ systems. Cardiovascular disease risk includes arrhythmias, atrial fibrillation, hypertension, coronary artery disease, and stroke. Metabolic disorders include diabetes type 2, non-alcoholic fatty liver disease. Mood disorder especially depression; and cognitive decline especially in the elderly. Associations with chronic reflux/Minor’s esophagus some but not all inclusive. \par -Reasons include arousal consistent with hypopnea; respiratory events most prominent in REM sleep or supine position; therefore sleep staging and body position are important for accurate diagnosis and estimation of AHI. \par \par Problem 6: Poor Mechanics of Breathing\par -Recommended to research the Wim Hof and Buteyko breathing methods \par - Proper breathing techniques were reviewed with an emphasis of exhalation. Patient instructed to breath in for 1 second and out for four seconds. Patient was encouraged to not talk while walking. \par \par Problem 7: Overweight\par -Weight loss, exercise, and diet control were discussed and are highly encouraged. Treatment options were given such as, aqua therapy, and contacting a nutritionist. Recommended to use the elliptical, stationary bike, less use of treadmill. Mindful eating was explained to the patient Obesity is associated with worsening asthma, shortness of breath, and potential for cardiac disease, diabetes, and other underlying medical conditions. \par \par Problem 8: Cardiac (Dr. Lopez) -r/o Pleuropericarditis (resolved)\par -recommended to follow up with a cardiologist\par -s/p ESR 76\par -s/p Colchicine 0.6 mg BID (completed)\par -s/p Motrin 400 mg qday (completed)\par \par problem 9: Health Maintenance/COVID19 Precautions:\par -covid 19 vaccine pending data\par - Clean your hands often. Wash your hands often with soap and water for at least 20 seconds, especially after blowing your nose, coughing, or sneezing, or having been in a public place.\par - If soap and water are not available, use a hand  that contains at least 60% alcohol.\par - To the extent possible, avoid touching high-touch surfaces in public places - elevator buttons, door handles, handrails, handshaking with people, etc. Use a tissue or your sleeve to cover your hand or finger if you must touch something.\par - Wash your hands after touching surfaces in public places.\par - Avoid touching your face, nose, eyes, etc.\par - Clean and disinfect your home to remove germs: practice routine cleaning of frequently touched surfaces (for example: tables, doorknobs, light switches, handles, desks, toilets, faucets, sinks & cell phones)\par - Avoid crowds, especially in poorly ventilated spaces. Your risk of exposure to respiratory viruses like COVID-19 may increase in crowded, closed-in settings with little air circulation if there are people in the crowd who are sick. All patients are recommended to practice social distancing and stay at least 6 feet away from others.\par - Avoid all non-essential travel including plane trips, and especially avoid embarking on cruise ships.\par -If COVID-19 is spreading in your community, take extra measures to put distance between yourself and other people to further reduce your risk of being exposed to this new virus.\par -Stay home as much as possible.\par - Consider ways of getting food brought to your house through family, social, or commercial networks\par -Be aware that the virus has been known to live in the air up to 3 hours post exposure, cardboard up to 24 hours post exposure, copper up to 4 hours post exposure, steel and plastic up to 2-3 days post exposure. Risk of transmission from these surfaces are affected by many variables.\par Immune Support Recommendations:\par -OTC Vitamin C 500mg BID \par -OTC Quercetin 250-500mg BID \par -OTC Zinc 75-100mg per day \par -OTC Melatonin 1 or 2 mg a night \par -OTC Vitamin D 1-4000mg per day \par -OTC Tonic Water 8oz per day\par Asthma and COVID19:\par You need to make sure your asthma is under control. This often requires the use of inhaled corticosteroids (and sometimes oral corticosteroids). Inhaled corticosteroids do not likely reduce your immune system’s ability to fight infections, but oral corticosteroids may. It is important to use the steps above to protect yourself to limit your exposure to any respiratory virus. \par \par Problem 10: health maintenance\par -s/p influenza vaccine\par -recommended strep pneumonia vaccines after age 65: Prevnar-13 vaccine, followed by Pneumo vaccine 23 one year following\par -recommended early intervention for URIs\par -recommended regular osteoporosis evaluations\par -recommended early dermatological evaluations\par -recommended after the age of 50 to the age of 70, colonoscopy every 5 years \par \par  Follow up in 6-8 weeks\par -he  is recommended to call with any changes, questions, or concerns.

## 2021-06-16 NOTE — PROCEDURE
[FreeTextEntry1] : 6 minute walk test reveals a low saturation of 95% with no evidence of dyspnea or fatigue; walked 440.1 meters

## 2021-06-16 NOTE — HISTORY OF PRESENT ILLNESS
[TextBox_4] : Ms. BILL is a 41 year old female presenting to the office today for pulmonary evaluation. Her chief complaint is RA\par -she reports she has not yet gotten the vaccine - waiting for more data\par -she states she is feeling generally well\par -she reports she travels between [place] and [place]. She had 2 instances of chest tightness while in [place], and hasn’t had any tightness while in [place]\par -she reports her energy level is good\par -she states she walks every day for 1 hour for exercise without limitations\par -she reports she is sleeping well\par -she states she had a bout of severe reflux a few weeks ago for 2 weeks. She went to the ER since she began vomiting and unable to keep food down. She is unsure what caused it, and it cleared suddenly. This has only happened one other time after starting to recover from Covid\par -she reports her rheumatoid arthritis is bothering her\par -she states she takes MTX and folic acid on the correct days\par -she denies any chest pain, chest pressure, diarrhea, constipation, dysphagia, dizziness, sour taste in the mouth, heartburn, reflux, myalgias

## 2021-06-16 NOTE — REVIEW OF SYSTEMS
[Negative] : Endocrine [Arthralgias] : arthralgias [Chest Tightness] : no chest tightness [Dyspnea] : no dyspnea [SOB on Exertion] : no sob on exertion [Chest Discomfort] : no chest discomfort [GERD] : no gerd [Diarrhea] : no diarrhea [Constipation] : no constipation [Dysphagia] : no dysphagia [Dizziness] : no dizziness

## 2021-06-19 LAB — SARS-COV-2 N GENE NPH QL NAA+PROBE: NOT DETECTED

## 2021-06-21 ENCOUNTER — APPOINTMENT (OUTPATIENT)
Dept: PULMONOLOGY | Facility: CLINIC | Age: 41
End: 2021-06-21
Payer: COMMERCIAL

## 2021-06-21 PROCEDURE — 94727 GAS DIL/WSHOT DETER LNG VOL: CPT

## 2021-06-21 PROCEDURE — 94729 DIFFUSING CAPACITY: CPT

## 2021-06-21 PROCEDURE — 94799 UNLISTED PULMONARY SVC/PX: CPT

## 2021-06-21 PROCEDURE — 94010 BREATHING CAPACITY TEST: CPT

## 2021-06-28 ENCOUNTER — TRANSCRIPTION ENCOUNTER (OUTPATIENT)
Age: 41
End: 2021-06-28

## 2021-07-07 ENCOUNTER — NON-APPOINTMENT (OUTPATIENT)
Age: 41
End: 2021-07-07

## 2021-08-05 ENCOUNTER — APPOINTMENT (OUTPATIENT)
Dept: PULMONOLOGY | Facility: CLINIC | Age: 41
End: 2021-08-05

## 2021-08-11 ENCOUNTER — APPOINTMENT (OUTPATIENT)
Dept: PULMONOLOGY | Facility: CLINIC | Age: 41
End: 2021-08-11
Payer: COMMERCIAL

## 2021-08-11 VITALS
HEIGHT: 61 IN | HEART RATE: 86 BPM | DIASTOLIC BLOOD PRESSURE: 80 MMHG | TEMPERATURE: 96.3 F | SYSTOLIC BLOOD PRESSURE: 130 MMHG | BODY MASS INDEX: 26.62 KG/M2 | WEIGHT: 141 LBS | OXYGEN SATURATION: 97 % | RESPIRATION RATE: 16 BRPM

## 2021-08-11 DIAGNOSIS — J45.909 UNSPECIFIED ASTHMA, UNCOMPLICATED: ICD-10-CM

## 2021-08-11 DIAGNOSIS — R07.81 PLEURODYNIA: ICD-10-CM

## 2021-08-11 DIAGNOSIS — R13.10 DYSPHAGIA, UNSPECIFIED: ICD-10-CM

## 2021-08-11 PROCEDURE — 99214 OFFICE O/P EST MOD 30 MIN: CPT

## 2021-08-11 RX ORDER — FLUTICASONE FUROATE AND VILANTEROL TRIFENATATE 200; 25 UG/1; UG/1
200-25 POWDER RESPIRATORY (INHALATION) DAILY
Qty: 1 | Refills: 3 | Status: ACTIVE | COMMUNITY
Start: 2021-03-18 | End: 1900-01-01

## 2021-08-13 PROBLEM — R07.81 CHEST PAIN, PLEURITIC: Status: RESOLVED | Noted: 2020-05-18 | Resolved: 2021-08-13

## 2021-08-13 PROBLEM — J45.909 ASTHMA: Status: ACTIVE | Noted: 2021-03-18

## 2021-08-13 PROBLEM — R13.10 SWALLOWING PROBLEM: Status: ACTIVE | Noted: 2021-08-13

## 2021-08-13 NOTE — REVIEW OF SYSTEMS
[Cough] : cough [GERD] : gerd [Arthralgias] : arthralgias [Negative] : Endocrine [Chest Tightness] : no chest tightness [Dyspnea] : no dyspnea [SOB on Exertion] : no sob on exertion [Chest Discomfort] : no chest discomfort [Diarrhea] : no diarrhea [Constipation] : no constipation [Dysphagia] : no dysphagia [Dizziness] : no dizziness [TextBox_30] : chest heaviness; feels like cannot take a full breath

## 2021-08-13 NOTE — HISTORY OF PRESENT ILLNESS
[TextBox_4] : Ms. BILL is a 41 year old female with a history of RA, scoliosis, childhood chickenpox, non smoker, GERD,  s/p pleural pericarditis 2020 and probable asthma who is in for follow up to discuss her PFT results and symptoms. \par \par Pt reports recently she has been dealing with high BP readings for her over the last 2 weeks. She checked her pressure due to feeling dizzy and little off. Her pressure has been in the 150's. She plans on speaking to her PCP about this next. No unusual stress in her life that she can think of. \par \par She is concerned that she has pulmonary fibrosis based on reading that she has done and would like to go through her work up again. \par \par She admits to severe GERD issues. She was in the ED multiple times for this. \par She is finally scheduled for esophageal manometry and barium swallow next for further evaluation. They did find gastritis and some reflux on recent testing but she notes feeling like food gets stuck in her esophagus or that the food does not move down. She is om omeprazole. She continues to have episodes of reflux/heartburn.\par \par Pulmonary wise- the chest tightness has improved for the most part since her last visit. \par She does note that she feels a fullness and heaviness in her chest on and off. She reports she has some intermittent cough as well. She reports that she does not feel short of breath but feels like she cant get a full breath in a lot of the time.\par She has not been using her Breo- she was unsure of the indication. \par \par She is back and forth between here and [place], plans on going back to [place] in October. \par She is on methotrexate weekly. \par She is also anemic- started on iron pills and reports her hgb has gotten up to 12+\par

## 2021-08-13 NOTE — ASSESSMENT
[FreeTextEntry1] : Ms. BILL is a 41 year old female with a history of RA, scoliosis, childhood chickenpox, non smoker, GERD,  s/p pleural pericarditis 2020 and probable asthma who is in for follow up to discuss her PFT results and symptoms.  Her symptoms seem to be a combination of severe GERD, possible esophageal spasm vs swallowing dysfunction (?achalasia) with gastritis that seems to be giving her chest symptoms. She is pending complete work up with GI. In addition to this, there is probable asthma. Weight loss would also help her somewhat. \par \par The patient's shortness of breath is multifactorial due to:\par -pulmonary disease \par      -?Pleuropericarditis, ?asthma, ?ALYSSA/ Poor sleep, ?anemia\par -poor breathing mechanics \par -overweight/out of shape\par -?cardiac disease () ?pleuropericarditis (no evidence PE)\par \par \par Problem 1: Asthma symptoms\par -start Breo Ellipta 200 at 1 inhalation QHS QD rinse and gargle\par -discussed indication and need for compliance to see if it is beneficial to her\par -Asthma is believed to be caused by inherited (genetic) and environmental factor, but its exact cause is unknown. Asthma may be triggered by allergens, lung infections, or irritants in the air. Asthma triggers are different for each person \par \par Problem 2: severe under treated GERD\par -continue pantoprazole 40 mg PO before breakfast\par -Add Pepcid 40 mg QHS -discussed indication in detail \par -Rule of 2s: avoid eating too much, eating too fast, eating too late, eating too spicy, eating too lousy, eating two hours before bed.\par -GI workup underway- consider achalasia?\par \par Problem 3: Anemia\par -on iron supplementation with + effect\par \par Problem 4: Overweight\par -Weight loss, exercise, and diet control were discussed and are highly encouraged. Treatment options were given such as, aqua therapy, and contacting a nutritionist. Recommended to use the elliptical, stationary bike, less use of treadmill. Mindful eating was explained to the patient Obesity is associated with worsening asthma, shortness of breath, and potential for cardiac disease, diabetes, and other underlying medical conditions. \par \par Problem 5: Cardiac (Dr. Lopez) -r/o Pleuropericarditis (resolved)\par -recommended to follow up with a cardiologist\par \par problem 6: RA hx with abnormal CT \par -Subsegmental bibasilar atelectasis. This is not concerning for pulmonary fibrosis however there is an association between rheum disorders and ILD. Continued surveillance needed; routine PFTs (pt is on methotrexate and at risk for methotrexate-induced lung injury- last CT without evidence of this)\par \par problem 7: Elevated BP\par -document Blood pressures am and early evening as well as when symptomatic\par -follow up with PCP and notify them of BP readings vs see cardiologist\par -pt was advised to not ignore this. \par \par  Follow up in 6-8 weeks\par -he  is recommended to call with any changes, questions, or concerns.

## 2021-08-13 NOTE — PHYSICAL EXAM
[No Acute Distress] : no acute distress [Well Nourished] : well nourished [No Deformities] : no deformities [Well Developed] : well developed [Normal Oropharynx] : normal oropharynx [III] : Mallampati Class: III [Normal Appearance] : normal appearance [Supple] : supple [No Neck Mass] : no neck mass [No JVD] : no jvd [Normal Rate/Rhythm] : normal rate/rhythm [Normal S1, S2] : normal s1, s2 [No Murmurs] : no murmurs [No Resp Distress] : no resp distress [No Acc Muscle Use] : no acc muscle use [Normal Palpation] : normal palpation [Normal Rhythm and Effort] : normal rhythm and effort [Clear to Auscultation Bilaterally] : clear to auscultation bilaterally [No Abnormalities] : no abnormalities [Benign] : benign [Normal Gait] : normal gait [No Clubbing] : no clubbing [No Cyanosis] : no cyanosis [No Edema] : no edema [FROM] : FROM [Normal Color/ Pigmentation] : normal color/ pigmentation [No Focal Deficits] : no focal deficits [Oriented x3] : oriented x3 [Normal Mood] : normal mood [Normal Affect] : normal affect [Remote Memory Normal] : remote memory normal [TextBox_68] : I:E ratio 1:3; clear

## 2021-08-13 NOTE — PROCEDURE
Products Recommended: Elta Sunblock daily recommended.
Detail Level: Detailed
General Sunscreen Counseling: I recommended a broad spectrum sunscreen with a SPF of 30 or higher.  Sunscreens should be applied at least 15 minutes prior to expected sun exposure and then every 2 hours after that as long as sun exposure continues. If swimming or sweating, need to use a water resistant sunblock and it should have an 80 minute time listed on the bottle. Need to reapply every 80 minutes in those instances. Encouraged sun protective clothing and hats.
[FreeTextEntry1] : Full PFT revealed mild restrictive dysfunction, with a FEV1 of 1.48 L, which is 63% of predicted, mild decreased lung volumes, and a mild reduced diffusion of 12.4, which is 61% of predicted, with a normal flow volume loop. \par \par \par \par EXAM: CT ANGIO CHEST (W)AW IC \par \par \par PROCEDURE DATE: 05/15/2020 \par \par \par \par INTERPRETATION: CLINICAL INFORMATION: Chest pain, shortness of breath, \par assess PE. \par \par PROCEDURE: CT angiography of the chest was performed with intravenous \par contrast utilizing dedicated PE protocol. 75 mls of Omnipaque-350 \par administered without complication. 25 ml discarded. Coronal and sagittal \par reconstruction images were obtained. Axial MIP images were obtained from a \par separate workstation. \par \par COMPARISON: 5/2/2020. \par \par FINDINGS: Artifact from the spinal fixation and respiratory motion degrades \par images. \par \par LUNGS AND AIRWAYS: Patent central airways. No focal consolidation. \par Subsegmental bibasilar atelectasis. \par PLEURA: No pleural effusion or pneumothorax. \par HEART: Normal size. No pericardial effusion. \par VESSELS: Adequate contrast opacification of the pulmonary arterial trees. No \par obvious pulmonary embolus, given the extent of artifact. Normal caliber of \par the thoracic aorta. \par MEDIASTINUM AND TOÑO: No lymphadenopathy. \par CHEST WALL AND LOWER NECK: Again noted, nonspecific stranding in the \par visualized bilateral flank soft tissue. \par UPPER ABDOMEN: Difficult to assess due to artifact. Accessory right renal \par artery. \par BONES: Leftward curvature of the spine. Probable syndesmophyte along the \par residual spine. Stable mild anterior wedging of the spine. \par \par IMPRESSION: \par \par No obvious pulmonary embolus, given the extent of artifact. \par \par \par \par \par \par \par \par NAYELI DAVENPORT M.D., ATTENDING RADIOLOGIST \par This document has been electronically signed. May 15 2020 7:11AM

## 2021-08-16 ENCOUNTER — TRANSCRIPTION ENCOUNTER (OUTPATIENT)
Age: 41
End: 2021-08-16

## 2021-08-17 ENCOUNTER — TRANSCRIPTION ENCOUNTER (OUTPATIENT)
Age: 41
End: 2021-08-17

## 2021-08-19 ENCOUNTER — TRANSCRIPTION ENCOUNTER (OUTPATIENT)
Age: 41
End: 2021-08-19

## 2021-08-24 ENCOUNTER — APPOINTMENT (OUTPATIENT)
Dept: CT IMAGING | Facility: IMAGING CENTER | Age: 41
End: 2021-08-24
Payer: COMMERCIAL

## 2021-08-24 ENCOUNTER — APPOINTMENT (OUTPATIENT)
Dept: RADIOLOGY | Facility: IMAGING CENTER | Age: 41
End: 2021-08-24
Payer: COMMERCIAL

## 2021-08-24 ENCOUNTER — OUTPATIENT (OUTPATIENT)
Dept: OUTPATIENT SERVICES | Facility: HOSPITAL | Age: 41
LOS: 1 days | End: 2021-08-24
Payer: COMMERCIAL

## 2021-08-24 DIAGNOSIS — Z00.8 ENCOUNTER FOR OTHER GENERAL EXAMINATION: ICD-10-CM

## 2021-08-24 DIAGNOSIS — R93.89 ABNORMAL FINDINGS ON DIAGNOSTIC IMAGING OF OTHER SPECIFIED BODY STRUCTURES: ICD-10-CM

## 2021-08-24 DIAGNOSIS — R06.02 SHORTNESS OF BREATH: ICD-10-CM

## 2021-08-24 DIAGNOSIS — Z98.890 OTHER SPECIFIED POSTPROCEDURAL STATES: Chronic | ICD-10-CM

## 2021-08-24 DIAGNOSIS — Z98.89 OTHER SPECIFIED POSTPROCEDURAL STATES: Chronic | ICD-10-CM

## 2021-08-24 PROCEDURE — 71046 X-RAY EXAM CHEST 2 VIEWS: CPT | Mod: 26

## 2021-08-24 PROCEDURE — 71250 CT THORAX DX C-: CPT | Mod: 26

## 2021-08-24 PROCEDURE — 71250 CT THORAX DX C-: CPT

## 2021-08-24 PROCEDURE — 71046 X-RAY EXAM CHEST 2 VIEWS: CPT

## 2021-08-25 ENCOUNTER — TRANSCRIPTION ENCOUNTER (OUTPATIENT)
Age: 41
End: 2021-08-25

## 2021-08-25 ENCOUNTER — NON-APPOINTMENT (OUTPATIENT)
Age: 41
End: 2021-08-25

## 2021-08-26 ENCOUNTER — TRANSCRIPTION ENCOUNTER (OUTPATIENT)
Age: 41
End: 2021-08-26

## 2021-08-27 ENCOUNTER — NON-APPOINTMENT (OUTPATIENT)
Age: 41
End: 2021-08-27

## 2021-08-27 ENCOUNTER — APPOINTMENT (OUTPATIENT)
Dept: GASTROENTEROLOGY | Facility: CLINIC | Age: 41
End: 2021-08-27
Payer: COMMERCIAL

## 2021-08-27 DIAGNOSIS — R13.10 DYSPHAGIA, UNSPECIFIED: ICD-10-CM

## 2021-08-27 PROCEDURE — 99442: CPT

## 2021-09-01 PROBLEM — R13.10 DYSPHAGIA, UNSPECIFIED TYPE: Status: ACTIVE | Noted: 2021-08-27

## 2021-09-04 ENCOUNTER — EMERGENCY (EMERGENCY)
Facility: HOSPITAL | Age: 41
LOS: 0 days | Discharge: HOME | End: 2021-09-05
Attending: EMERGENCY MEDICINE
Payer: COMMERCIAL

## 2021-09-04 VITALS
RESPIRATION RATE: 19 BRPM | TEMPERATURE: 98 F | HEART RATE: 97 BPM | OXYGEN SATURATION: 99 % | DIASTOLIC BLOOD PRESSURE: 78 MMHG | WEIGHT: 141.1 LBS | SYSTOLIC BLOOD PRESSURE: 112 MMHG | HEIGHT: 59 IN

## 2021-09-04 DIAGNOSIS — R00.2 PALPITATIONS: ICD-10-CM

## 2021-09-04 DIAGNOSIS — M06.9 RHEUMATOID ARTHRITIS, UNSPECIFIED: ICD-10-CM

## 2021-09-04 DIAGNOSIS — Z20.822 CONTACT WITH AND (SUSPECTED) EXPOSURE TO COVID-19: ICD-10-CM

## 2021-09-04 DIAGNOSIS — M41.9 SCOLIOSIS, UNSPECIFIED: ICD-10-CM

## 2021-09-04 DIAGNOSIS — R07.89 OTHER CHEST PAIN: ICD-10-CM

## 2021-09-04 DIAGNOSIS — Z98.890 OTHER SPECIFIED POSTPROCEDURAL STATES: Chronic | ICD-10-CM

## 2021-09-04 DIAGNOSIS — I31.9 DISEASE OF PERICARDIUM, UNSPECIFIED: ICD-10-CM

## 2021-09-04 DIAGNOSIS — I26.99 OTHER PULMONARY EMBOLISM WITHOUT ACUTE COR PULMONALE: ICD-10-CM

## 2021-09-04 DIAGNOSIS — K21.9 GASTRO-ESOPHAGEAL REFLUX DISEASE WITHOUT ESOPHAGITIS: ICD-10-CM

## 2021-09-04 DIAGNOSIS — Z98.89 OTHER SPECIFIED POSTPROCEDURAL STATES: Chronic | ICD-10-CM

## 2021-09-04 PROCEDURE — 99285 EMERGENCY DEPT VISIT HI MDM: CPT

## 2021-09-04 NOTE — ED ADULT NURSE NOTE - OBJECTIVE STATEMENT
pt c/o R-side chest pain that is gone now.  +nausea.  also c/o "GI discomfort" on/off x 1 month (PMH-GERD),  also c/o constipation/diarrhea.  last episode of diarrhea on tuesday, constipation since thursday.  also headache after eating.  pt denies all pain at this time.  denies SOB.  stated that her daughter has covid currently.  denies cough/fever/chills

## 2021-09-04 NOTE — ED ADULT TRIAGE NOTE - CHIEF COMPLAINT QUOTE
Pt c/o R side CP, which subsided, feeling of passing out, and nausea x today. h/o RA, Scoliosis, gastritis, asthma. pt stated she was expose to daughter which is covid +

## 2021-09-04 NOTE — ED ADULT NURSE NOTE - NSICDXPASTSURGICALHX_GEN_ALL_CORE_FT
PAST SURGICAL HISTORY:  History of back surgery Rods in back for scolosis    Personal history of spine surgery 1993 lizzie in back/surg for tx of scoliosis

## 2021-09-04 NOTE — ED ADULT NURSE NOTE - NSICDXPASTMEDICALHX_GEN_ALL_CORE_FT
PAST MEDICAL HISTORY:  Arthritis, rheumatoid     Flu diagnosed with the flu 4/19/2020    Pericarditis     Pulmonary embolism     Rheumatoid arthritis     Scoliosis     Scoliosis

## 2021-09-05 VITALS
TEMPERATURE: 98 F | SYSTOLIC BLOOD PRESSURE: 126 MMHG | RESPIRATION RATE: 17 BRPM | DIASTOLIC BLOOD PRESSURE: 90 MMHG | OXYGEN SATURATION: 98 % | HEART RATE: 89 BPM

## 2021-09-05 LAB
ALBUMIN SERPL ELPH-MCNC: 3.4 G/DL — SIGNIFICANT CHANGE UP (ref 3.3–5)
ALP SERPL-CCNC: 78 U/L — SIGNIFICANT CHANGE UP (ref 40–120)
ALT FLD-CCNC: 26 U/L — SIGNIFICANT CHANGE UP (ref 12–78)
ANION GAP SERPL CALC-SCNC: 2 MMOL/L — LOW (ref 5–17)
AST SERPL-CCNC: 15 U/L — SIGNIFICANT CHANGE UP (ref 15–37)
BASOPHILS # BLD AUTO: 0.03 K/UL — SIGNIFICANT CHANGE UP (ref 0–0.2)
BASOPHILS NFR BLD AUTO: 0.4 % — SIGNIFICANT CHANGE UP (ref 0–2)
BILIRUB SERPL-MCNC: 0.2 MG/DL — SIGNIFICANT CHANGE UP (ref 0.2–1.2)
BUN SERPL-MCNC: 7 MG/DL — SIGNIFICANT CHANGE UP (ref 7–23)
CALCIUM SERPL-MCNC: 9.3 MG/DL — SIGNIFICANT CHANGE UP (ref 8.5–10.1)
CHLORIDE SERPL-SCNC: 108 MMOL/L — SIGNIFICANT CHANGE UP (ref 96–108)
CO2 SERPL-SCNC: 32 MMOL/L — HIGH (ref 22–31)
CREAT SERPL-MCNC: 0.65 MG/DL — SIGNIFICANT CHANGE UP (ref 0.5–1.3)
D DIMER BLD IA.RAPID-MCNC: 306 NG/ML DDU — HIGH
EOSINOPHIL # BLD AUTO: 0.05 K/UL — SIGNIFICANT CHANGE UP (ref 0–0.5)
EOSINOPHIL NFR BLD AUTO: 0.7 % — SIGNIFICANT CHANGE UP (ref 0–6)
FLUAV AG NPH QL: SIGNIFICANT CHANGE UP
FLUBV AG NPH QL: SIGNIFICANT CHANGE UP
GLUCOSE SERPL-MCNC: 114 MG/DL — HIGH (ref 70–99)
HCG SERPL-ACNC: 2 MIU/ML — SIGNIFICANT CHANGE UP
HCT VFR BLD CALC: 39 % — SIGNIFICANT CHANGE UP (ref 34.5–45)
HGB BLD-MCNC: 13 G/DL — SIGNIFICANT CHANGE UP (ref 11.5–15.5)
IMM GRANULOCYTES NFR BLD AUTO: 0.3 % — SIGNIFICANT CHANGE UP (ref 0–1.5)
LIDOCAIN IGE QN: 167 U/L — SIGNIFICANT CHANGE UP (ref 73–393)
LYMPHOCYTES # BLD AUTO: 1.07 K/UL — SIGNIFICANT CHANGE UP (ref 1–3.3)
LYMPHOCYTES # BLD AUTO: 14.2 % — SIGNIFICANT CHANGE UP (ref 13–44)
MAGNESIUM SERPL-MCNC: 2.3 MG/DL — SIGNIFICANT CHANGE UP (ref 1.6–2.6)
MCHC RBC-ENTMCNC: 30.1 PG — SIGNIFICANT CHANGE UP (ref 27–34)
MCHC RBC-ENTMCNC: 33.3 GM/DL — SIGNIFICANT CHANGE UP (ref 32–36)
MCV RBC AUTO: 90.3 FL — SIGNIFICANT CHANGE UP (ref 80–100)
MONOCYTES # BLD AUTO: 0.48 K/UL — SIGNIFICANT CHANGE UP (ref 0–0.9)
MONOCYTES NFR BLD AUTO: 6.4 % — SIGNIFICANT CHANGE UP (ref 2–14)
NEUTROPHILS # BLD AUTO: 5.9 K/UL — SIGNIFICANT CHANGE UP (ref 1.8–7.4)
NEUTROPHILS NFR BLD AUTO: 78 % — HIGH (ref 43–77)
NRBC # BLD: 0 /100 WBCS — SIGNIFICANT CHANGE UP (ref 0–0)
PLATELET # BLD AUTO: 322 K/UL — SIGNIFICANT CHANGE UP (ref 150–400)
POTASSIUM SERPL-MCNC: 4.1 MMOL/L — SIGNIFICANT CHANGE UP (ref 3.5–5.3)
POTASSIUM SERPL-SCNC: 4.1 MMOL/L — SIGNIFICANT CHANGE UP (ref 3.5–5.3)
PROT SERPL-MCNC: 7.9 GM/DL — SIGNIFICANT CHANGE UP (ref 6–8.3)
RBC # BLD: 4.32 M/UL — SIGNIFICANT CHANGE UP (ref 3.8–5.2)
RBC # FLD: 13.6 % — SIGNIFICANT CHANGE UP (ref 10.3–14.5)
SARS-COV-2 RNA SPEC QL NAA+PROBE: SIGNIFICANT CHANGE UP
SODIUM SERPL-SCNC: 142 MMOL/L — SIGNIFICANT CHANGE UP (ref 135–145)
TROPONIN I SERPL-MCNC: <.015 NG/ML — SIGNIFICANT CHANGE UP (ref 0.01–0.04)
TROPONIN I SERPL-MCNC: <.015 NG/ML — SIGNIFICANT CHANGE UP (ref 0.01–0.04)
WBC # BLD: 7.55 K/UL — SIGNIFICANT CHANGE UP (ref 3.8–10.5)
WBC # FLD AUTO: 7.55 K/UL — SIGNIFICANT CHANGE UP (ref 3.8–10.5)

## 2021-09-05 PROCEDURE — 71045 X-RAY EXAM CHEST 1 VIEW: CPT | Mod: 26

## 2021-09-05 PROCEDURE — 71275 CT ANGIOGRAPHY CHEST: CPT | Mod: 26,MH

## 2021-09-05 PROCEDURE — 93010 ELECTROCARDIOGRAM REPORT: CPT

## 2021-09-05 PROCEDURE — 74177 CT ABD & PELVIS W/CONTRAST: CPT | Mod: 26,MH

## 2021-09-05 RX ORDER — FAMOTIDINE 10 MG/ML
20 INJECTION INTRAVENOUS ONCE
Refills: 0 | Status: COMPLETED | OUTPATIENT
Start: 2021-09-05 | End: 2021-09-05

## 2021-09-05 RX ORDER — ACETAMINOPHEN 500 MG
650 TABLET ORAL ONCE
Refills: 0 | Status: COMPLETED | OUTPATIENT
Start: 2021-09-05 | End: 2021-09-05

## 2021-09-05 RX ORDER — SODIUM CHLORIDE 9 MG/ML
2000 INJECTION INTRAMUSCULAR; INTRAVENOUS; SUBCUTANEOUS ONCE
Refills: 0 | Status: COMPLETED | OUTPATIENT
Start: 2021-09-05 | End: 2021-09-05

## 2021-09-05 RX ORDER — OMEPRAZOLE 10 MG/1
1 CAPSULE, DELAYED RELEASE ORAL
Qty: 30 | Refills: 0
Start: 2021-09-05 | End: 2021-10-04

## 2021-09-05 RX ORDER — METOCLOPRAMIDE HCL 10 MG
10 TABLET ORAL ONCE
Refills: 0 | Status: COMPLETED | OUTPATIENT
Start: 2021-09-05 | End: 2021-09-05

## 2021-09-05 RX ORDER — ONDANSETRON 8 MG/1
4 TABLET, FILM COATED ORAL ONCE
Refills: 0 | Status: COMPLETED | OUTPATIENT
Start: 2021-09-05 | End: 2021-09-05

## 2021-09-05 RX ADMIN — FAMOTIDINE 20 MILLIGRAM(S): 10 INJECTION INTRAVENOUS at 00:16

## 2021-09-05 RX ADMIN — ONDANSETRON 4 MILLIGRAM(S): 8 TABLET, FILM COATED ORAL at 00:16

## 2021-09-05 RX ADMIN — Medication 650 MILLIGRAM(S): at 05:11

## 2021-09-05 RX ADMIN — Medication 30 MILLILITER(S): at 00:19

## 2021-09-05 RX ADMIN — Medication 10 MILLIGRAM(S): at 05:11

## 2021-09-05 RX ADMIN — SODIUM CHLORIDE 2000 MILLILITER(S): 9 INJECTION INTRAMUSCULAR; INTRAVENOUS; SUBCUTANEOUS at 00:16

## 2021-09-05 NOTE — ED PROVIDER NOTE - NSFOLLOWUPINSTRUCTIONS_ED_ALL_ED_FT
Follow up with your primary care doctor within the next 24-48 hours and bring copy of your results.  Return to the Emergency Department for worsening or persistent symptoms or any other concerns incl. chest pain, shortness of breath, dizziness, inability to tolerate oral intake.  Rest, drink plenty of fluids.  Advance activity as tolerated.  Continue all previously prescribed medications as directed. You can use Tylenol 650 mg every 4 hours for pain/fever (Max 4g/day of tylenol) for the headache

## 2021-09-05 NOTE — ED PROVIDER NOTE - CLINICAL SUMMARY MEDICAL DECISION MAKING FREE TEXT BOX
pt tolerated oral intake in ER, likely gerd/gastritis. CE neg  x2. CT scan demonstrates no acute pathology. dc with meds.

## 2021-09-05 NOTE — ED PROVIDER NOTE - PHYSICAL EXAMINATION
Gen: Alert, Well appearing. NAD    Head: NC, AT, PERRL, normal lids/conjunctiva   ENT: patent oropharynx without erythema/exudate, uvula midline  Neck: supple, no tenderness/meningismus  Pulm: Bilateral clear BS, normal resp effort  CV: RRR, no M/R/G, +dist pulses   Abd: soft, NT/ND, +BS, no guarding/rebound tenderness  Mskel:  no edema/erythema/cyanosis   Skin: no rash, no bruising  Neuro: AAOx3, no sensory/motor deficits, CN 2-12 intact

## 2021-09-05 NOTE — ED PROVIDER NOTE - OBJECTIVE STATEMENT
PT orders for evaluate and treat received. Attempted eval however pt off the unit for dialysis and then per medical chart to be d/c'd to [facility] after that.  Will follow-up with pt tomorrow if she remains in house .start   40 y/o F with PMHx of RA(Predisone, Methotrexate), Scoliosis, Pericarditis, Gastritis, Pulmonary Embolism and PSHx of Back Surgery presents to the ED c/o burning chest pain for x1 week, with decreased PO intake. Pt endorses nausea, LUQ pain, headache, lightheadedness but reports sx resolve after emesis. Pt states she has been generally tired from the sx, and today, ate x1 hour before when she started to feel a band around her head, felt like she was going to pass out with onset of palpitations. Pt reports her heart rate was in the 150's. Denies CP, SOB, headache, dizziness or constipation currently. Pt reports she is on Pepcid for her GI issues and her daughter tested positive for COVID x1 week ago, but the pt tested negative x6 days ago. Denies fever/chills, cough, dysuria or diarrhea.    No fever/chills, No photophobia/eye pain/changes in vision, No ear pain/sore throat/dysphagia, No chest pain/palpitations, no SOB/cough/wheeze/stridor, No abdominal pain, No diarrhea, no dysuria/frequency/discharge, No neck/back pain, no rash, no changes in neurological status/function, +burning chest pain, +nausea, +LUQ pain, +lightheadedness, +vomiting. .start   42 y/o F with PMHx of RA(Predisone, Methotrexate), Scoliosis, Gastritis, PSHx of Back Surgery presents to the ED c/o burning chest pain for x1 week, with decreased PO intake. Pt endorses nausea, LUQ pain, headache, lightheadedness but reports sx resolve after emesis. Pt states she has been generally tired from the sx, and today, ate x1 hour before when she started to feel a band like headache, felt like she was going to pass out with onset of palpitations. Pt reports her heart rate was in the 150's. Denies CP, SOB, headache, dizziness or constipation currently. Pt reports she is on Pepcid for her GI issues and her daughter tested positive for COVID x1 week ago, but the pt tested negative x6 days ago. Denies fever/chills, cough, dysuria or diarrhea.    No fever/chills, No photophobia/eye pain/changes in vision, No ear pain/sore throat/dysphagia, No chest pain/palpitations, no SOB/cough/wheeze/stridor, No abdominal pain, No diarrhea, no dysuria/frequency/discharge, No neck/back pain, no rash, no changes in neurological status/function, +burning chest pain, +nausea, +LUQ pain, +lightheadedness, +vomiting.

## 2021-09-05 NOTE — ED PROVIDER NOTE - PATIENT PORTAL LINK FT
You can access the FollowMyHealth Patient Portal offered by [facility] by registering at the following website: [email or web address]. By joining [employer]’s FollowMyHealth portal, you will also be able to view your health information using other applications (apps) compatible with our system.

## 2021-09-05 NOTE — ED PROVIDER NOTE - CARE PLAN
1 Principal Discharge DX:	GERD (gastroesophageal reflux disease)  Secondary Diagnosis:	Palpitations

## 2021-09-07 ENCOUNTER — TRANSCRIPTION ENCOUNTER (OUTPATIENT)
Age: 41
End: 2021-09-07

## 2021-09-08 ENCOUNTER — EMERGENCY (EMERGENCY)
Facility: HOSPITAL | Age: 41
LOS: 1 days | Discharge: HOME | End: 2021-09-08
Attending: STUDENT IN AN ORGANIZED HEALTH CARE EDUCATION/TRAINING PROGRAM
Payer: COMMERCIAL

## 2021-09-08 VITALS
DIASTOLIC BLOOD PRESSURE: 77 MMHG | RESPIRATION RATE: 18 BRPM | WEIGHT: 141.1 LBS | HEIGHT: 59 IN | HEART RATE: 115 BPM | SYSTOLIC BLOOD PRESSURE: 135 MMHG | OXYGEN SATURATION: 98 % | TEMPERATURE: 98 F

## 2021-09-08 VITALS
DIASTOLIC BLOOD PRESSURE: 80 MMHG | RESPIRATION RATE: 17 BRPM | OXYGEN SATURATION: 98 % | TEMPERATURE: 98 F | SYSTOLIC BLOOD PRESSURE: 118 MMHG | HEART RATE: 81 BPM

## 2021-09-08 DIAGNOSIS — Z98.890 OTHER SPECIFIED POSTPROCEDURAL STATES: Chronic | ICD-10-CM

## 2021-09-08 DIAGNOSIS — Z98.89 OTHER SPECIFIED POSTPROCEDURAL STATES: Chronic | ICD-10-CM

## 2021-09-08 LAB
ALBUMIN SERPL ELPH-MCNC: 4.1 G/DL — SIGNIFICANT CHANGE UP (ref 3.3–5)
ALP SERPL-CCNC: 73 U/L — SIGNIFICANT CHANGE UP (ref 40–120)
ALT FLD-CCNC: 19 U/L — SIGNIFICANT CHANGE UP (ref 10–45)
ANION GAP SERPL CALC-SCNC: 14 MMOL/L — SIGNIFICANT CHANGE UP (ref 5–17)
AST SERPL-CCNC: 15 U/L — SIGNIFICANT CHANGE UP (ref 10–40)
BASOPHILS # BLD AUTO: 0.04 K/UL — SIGNIFICANT CHANGE UP (ref 0–0.2)
BASOPHILS NFR BLD AUTO: 0.7 % — SIGNIFICANT CHANGE UP (ref 0–2)
BILIRUB SERPL-MCNC: 0.3 MG/DL — SIGNIFICANT CHANGE UP (ref 0.2–1.2)
BLD GP AB SCN SERPL QL: NEGATIVE — SIGNIFICANT CHANGE UP
BUN SERPL-MCNC: 6 MG/DL — LOW (ref 7–23)
CALCIUM SERPL-MCNC: 9.8 MG/DL — SIGNIFICANT CHANGE UP (ref 8.4–10.5)
CHLORIDE SERPL-SCNC: 103 MMOL/L — SIGNIFICANT CHANGE UP (ref 96–108)
CO2 SERPL-SCNC: 25 MMOL/L — SIGNIFICANT CHANGE UP (ref 22–31)
CREAT SERPL-MCNC: 0.65 MG/DL — SIGNIFICANT CHANGE UP (ref 0.5–1.3)
EOSINOPHIL # BLD AUTO: 0.05 K/UL — SIGNIFICANT CHANGE UP (ref 0–0.5)
EOSINOPHIL NFR BLD AUTO: 0.8 % — SIGNIFICANT CHANGE UP (ref 0–6)
GLUCOSE SERPL-MCNC: 80 MG/DL — SIGNIFICANT CHANGE UP (ref 70–99)
HCG UR QL: NEGATIVE — SIGNIFICANT CHANGE UP
HCT VFR BLD CALC: 38 % — SIGNIFICANT CHANGE UP (ref 34.5–45)
HGB BLD-MCNC: 12.2 G/DL — SIGNIFICANT CHANGE UP (ref 11.5–15.5)
IMM GRANULOCYTES NFR BLD AUTO: 0.3 % — SIGNIFICANT CHANGE UP (ref 0–1.5)
LIDOCAIN IGE QN: 43 U/L — SIGNIFICANT CHANGE UP (ref 7–60)
LYMPHOCYTES # BLD AUTO: 1.39 K/UL — SIGNIFICANT CHANGE UP (ref 1–3.3)
LYMPHOCYTES # BLD AUTO: 23.1 % — SIGNIFICANT CHANGE UP (ref 13–44)
MCHC RBC-ENTMCNC: 29.6 PG — SIGNIFICANT CHANGE UP (ref 27–34)
MCHC RBC-ENTMCNC: 32.1 GM/DL — SIGNIFICANT CHANGE UP (ref 32–36)
MCV RBC AUTO: 92.2 FL — SIGNIFICANT CHANGE UP (ref 80–100)
MONOCYTES # BLD AUTO: 0.52 K/UL — SIGNIFICANT CHANGE UP (ref 0–0.9)
MONOCYTES NFR BLD AUTO: 8.7 % — SIGNIFICANT CHANGE UP (ref 2–14)
NEUTROPHILS # BLD AUTO: 3.99 K/UL — SIGNIFICANT CHANGE UP (ref 1.8–7.4)
NEUTROPHILS NFR BLD AUTO: 66.4 % — SIGNIFICANT CHANGE UP (ref 43–77)
NRBC # BLD: 0 /100 WBCS — SIGNIFICANT CHANGE UP (ref 0–0)
OB PNL STL: NEGATIVE — SIGNIFICANT CHANGE UP
PLATELET # BLD AUTO: 309 K/UL — SIGNIFICANT CHANGE UP (ref 150–400)
POTASSIUM SERPL-MCNC: 3.6 MMOL/L — SIGNIFICANT CHANGE UP (ref 3.5–5.3)
POTASSIUM SERPL-SCNC: 3.6 MMOL/L — SIGNIFICANT CHANGE UP (ref 3.5–5.3)
PROT SERPL-MCNC: 7.4 G/DL — SIGNIFICANT CHANGE UP (ref 6–8.3)
RBC # BLD: 4.12 M/UL — SIGNIFICANT CHANGE UP (ref 3.8–5.2)
RBC # FLD: 13.4 % — SIGNIFICANT CHANGE UP (ref 10.3–14.5)
RH IG SCN BLD-IMP: POSITIVE — SIGNIFICANT CHANGE UP
RH IG SCN BLD-IMP: POSITIVE — SIGNIFICANT CHANGE UP
SODIUM SERPL-SCNC: 142 MMOL/L — SIGNIFICANT CHANGE UP (ref 135–145)
WBC # BLD: 6.01 K/UL — SIGNIFICANT CHANGE UP (ref 3.8–10.5)
WBC # FLD AUTO: 6.01 K/UL — SIGNIFICANT CHANGE UP (ref 3.8–10.5)

## 2021-09-08 PROCEDURE — 86900 BLOOD TYPING SEROLOGIC ABO: CPT

## 2021-09-08 PROCEDURE — 99284 EMERGENCY DEPT VISIT MOD MDM: CPT | Mod: 25

## 2021-09-08 PROCEDURE — 96374 THER/PROPH/DIAG INJ IV PUSH: CPT

## 2021-09-08 PROCEDURE — 86901 BLOOD TYPING SEROLOGIC RH(D): CPT

## 2021-09-08 PROCEDURE — 86850 RBC ANTIBODY SCREEN: CPT

## 2021-09-08 PROCEDURE — 80053 COMPREHEN METABOLIC PANEL: CPT

## 2021-09-08 PROCEDURE — 81025 URINE PREGNANCY TEST: CPT

## 2021-09-08 PROCEDURE — 85025 COMPLETE CBC W/AUTO DIFF WBC: CPT

## 2021-09-08 PROCEDURE — 83690 ASSAY OF LIPASE: CPT

## 2021-09-08 PROCEDURE — 82272 OCCULT BLD FECES 1-3 TESTS: CPT

## 2021-09-08 PROCEDURE — 99284 EMERGENCY DEPT VISIT MOD MDM: CPT

## 2021-09-08 RX ORDER — ONDANSETRON 8 MG/1
4 TABLET, FILM COATED ORAL ONCE
Refills: 0 | Status: COMPLETED | OUTPATIENT
Start: 2021-09-08 | End: 2021-09-08

## 2021-09-08 RX ADMIN — Medication 30 MILLILITER(S): at 12:53

## 2021-09-08 RX ADMIN — ONDANSETRON 4 MILLIGRAM(S): 8 TABLET, FILM COATED ORAL at 12:10

## 2021-09-08 NOTE — ED PROVIDER NOTE - NSFOLLOWUPINSTRUCTIONS_ED_ALL_ED_FT
Follow up with your gastroenterologist  Dr. Rinaldi  Call your doctor or return to the emergency department if you are feeling worse or if:    You are unable to walk easily or walking in a bent-over position  The abdomen is hard and painful when you press on it  There is severe abdominal pain when coughing.  you are vomiting uncontrollably  Vomiting is bloody, green or looks like chocolate or coffee  You are experiencing severe pain every 3 to 20 minutes  Stool is bloody or black  You are drowsy, weak, or pale.

## 2021-09-08 NOTE — ED PROVIDER NOTE - ATTENDING CONTRIBUTION TO CARE
41 F w/ hx of RA here w dark stools 41 F w/ hx of RA here w dark stools on iron pills, gastritis, scoliosis, psh of back surgery here w/ n (drank a large container of beet juice yesterday), abdominal pain that is chronic worse w/ po intake. Pt tolerated a peanut butter sandwich. She has no fevers, no chills.   On exam, pt is awake and alert in no acute distress, she has soft abdomen no cva tenderness. Plan for labs, and reassessment.   suspect dark stools 2/2 iron pills, pt w/ brbpr, Plan to dc home w/ outpt follow up

## 2021-09-08 NOTE — ED PROVIDER NOTE - OBJECTIVE STATEMENT
42 y/o F with PMHx of RA(Predisone, Methotrexate), Scoliosis, Gastritis, PSHx of Back Surgery presents to the ED c/o nausea, dark colored stools,  abdominal pain when she has any oral intake progressively getting worst over the last month. Patient states she follows With GI Dr. Adan Rinaldi who has previously done endoscopy x3 weeks ago which showed no abnormalities.  Patient states she is doing manometry procedure in the end of this month to further evaluate her symptoms. She states she was started on famotidine x2 weeks ago as her GI doctor believes her symptoms are due to GERD. Patient denies fever, chills. shortness of breath, chest pain, leg swelling,

## 2021-09-08 NOTE — ED ADULT NURSE NOTE - OBJECTIVE STATEMENT
42 yo female PMH RA on methotrexate and predisone, scoliosis, gastritis, presents to ED 42 yo female PMH RA on methotrexate and predisone, scoliosis, gastritis, presents to ED with nausea, decreased PO intake, x 1 month abd pain, and 5 episodes of dark colored stools x 2 days. Pt states she has been seeing her GI, had an endoscopy done 3 weeks ago without findings. Abd soft, nondistended, nontender. Denies CP, SOB, diarrhea, fevers, chills,  urinary symptoms, weakness, fatigue, numbness, tingling in upper and lower extremities, HA, blurry vision. VSS updated on plan of care.

## 2021-09-08 NOTE — ED ADULT NURSE NOTE - NSICDXPASTMEDICALHX_GEN_ALL_CORE_FT
PAST MEDICAL HISTORY:  Arthritis, rheumatoid     Flu diagnosed with the flu 4/19/2020    GERD (gastroesophageal reflux disease)     Pericarditis     Pulmonary embolism     Rheumatoid arthritis     Scoliosis     Scoliosis

## 2021-09-08 NOTE — ED ADULT TRIAGE NOTE - CHIEF COMPLAINT QUOTE
Black stool feels tired Abd pain   LLQ pain After eating pain worsens On Iron supplement also c/o headache

## 2021-09-20 DIAGNOSIS — Z01.818 ENCOUNTER FOR OTHER PREPROCEDURAL EXAMINATION: ICD-10-CM

## 2021-09-21 ENCOUNTER — APPOINTMENT (OUTPATIENT)
Dept: DISASTER EMERGENCY | Facility: CLINIC | Age: 41
End: 2021-09-21

## 2021-09-22 LAB — SARS-COV-2 N GENE NPH QL NAA+PROBE: NOT DETECTED

## 2021-09-23 PROBLEM — K21.9 GASTRO-ESOPHAGEAL REFLUX DISEASE WITHOUT ESOPHAGITIS: Chronic | Status: ACTIVE | Noted: 2021-09-08

## 2021-09-24 ENCOUNTER — APPOINTMENT (OUTPATIENT)
Dept: GASTROENTEROLOGY | Facility: HOSPITAL | Age: 41
End: 2021-09-24

## 2021-09-24 ENCOUNTER — OUTPATIENT (OUTPATIENT)
Dept: OUTPATIENT SERVICES | Facility: HOSPITAL | Age: 41
LOS: 1 days | End: 2021-09-24
Payer: COMMERCIAL

## 2021-09-24 VITALS
SYSTOLIC BLOOD PRESSURE: 139 MMHG | WEIGHT: 132.94 LBS | HEIGHT: 59 IN | RESPIRATION RATE: 16 BRPM | OXYGEN SATURATION: 100 % | TEMPERATURE: 98 F | HEART RATE: 85 BPM | DIASTOLIC BLOOD PRESSURE: 85 MMHG

## 2021-09-24 DIAGNOSIS — Z98.89 OTHER SPECIFIED POSTPROCEDURAL STATES: Chronic | ICD-10-CM

## 2021-09-24 DIAGNOSIS — R13.10 DYSPHAGIA, UNSPECIFIED: ICD-10-CM

## 2021-09-24 DIAGNOSIS — Z98.890 OTHER SPECIFIED POSTPROCEDURAL STATES: Chronic | ICD-10-CM

## 2021-09-24 DIAGNOSIS — R07.89 OTHER CHEST PAIN: ICD-10-CM

## 2021-09-24 PROCEDURE — 91037 ESOPH IMPED FUNCTION TEST: CPT | Mod: 26

## 2021-09-24 PROCEDURE — 91010 ESOPHAGUS MOTILITY STUDY: CPT

## 2021-09-24 PROCEDURE — 91010 ESOPHAGUS MOTILITY STUDY: CPT | Mod: 26

## 2021-09-24 RX ORDER — ESOMEPRAZOLE MAGNESIUM 40 MG/1
1 CAPSULE, DELAYED RELEASE ORAL
Qty: 0 | Refills: 0 | DISCHARGE

## 2021-09-24 RX ORDER — METHOTREXATE 2.5 MG/1
2.5 TABLET ORAL
Qty: 0 | Refills: 0 | DISCHARGE

## 2021-09-24 RX ORDER — ASPIRIN/CALCIUM CARB/MAGNESIUM 324 MG
0 TABLET ORAL
Qty: 0 | Refills: 0 | DISCHARGE

## 2021-09-24 RX ORDER — FOLIC ACID 0.8 MG
1 TABLET ORAL
Qty: 0 | Refills: 0 | DISCHARGE

## 2021-09-24 NOTE — PRE PROCEDURE NOTE - PRE PROCEDURE EVALUATION
Attending Physician:              Tito Reed MD MSEd  Performing provider:            DAMIEN Gregg  Indication for Procedure:       Atypical Chest Pain               PAST MEDICAL & SURGICAL HISTORY:  Scoliosis    Arthritis, rheumatoid    Pulmonary embolism    Pericarditis    Scoliosis    Rheumatoid arthritis    Flu  diagnosed with the flu 4/19/2020    GERD (gastroesophageal reflux disease)    History of back surgery  Rods in back for scolosis    Personal history of spine surgery  1993 lizzie in back/surg for tx of scoliosis          See Allscripts Note for further details  ALLERGIES:  No Known Allergies    HOME MEDICATIONS:  aspirin:   esomeprazole 40 mg oral delayed release capsule: 1 cap(s) orally once a day  folic acid 1 mg oral tablet: 1 tab(s) orally once a day  methotrexate 2.5 mg oral tablet: 2.5 dose(s) orally once a week  predniSONE 5 mg oral tablet: 1 tab(s) orally once a day      See Allscripts Note for further details    AICD/PPM: [ ] yes   [X ] no    PERTINENT LAB DATA:                      PHYSICAL EXAMINATION:    Height (cm): 149.9  Weight (kg): 60.3  BMI (kg/m2): 26.8  BSA (m2): 1.55T(C): 36.7  HR: 85  BP: 139/85  RR: 16  SpO2: 100%    Constitutional: NAD  HEENT: PERRLA, EOMI,    Neck:  No JVD  Respiratory: CTAB/L  Cardiovascular: S1 and S2  Gastrointestinal: BS+, soft, NT/ND  Extremities: No peripheral edema  Neurological: A/O x 3, no focal deficits  Psychiatric: Normal mood, normal affect  Skin: No rashes    ASA Class: I [ ]  II [X ]  III [ ]  IV [ ]    COMMENTS:    The patient is a suitable candidate for the planned procedure unless box checked [ ]  No, explain:

## 2021-10-04 ENCOUNTER — APPOINTMENT (OUTPATIENT)
Dept: PULMONOLOGY | Facility: CLINIC | Age: 41
End: 2021-10-04

## 2021-10-07 ENCOUNTER — APPOINTMENT (OUTPATIENT)
Dept: PULMONOLOGY | Facility: CLINIC | Age: 41
End: 2021-10-07
Payer: COMMERCIAL

## 2021-10-07 VITALS
SYSTOLIC BLOOD PRESSURE: 116 MMHG | HEIGHT: 61 IN | DIASTOLIC BLOOD PRESSURE: 78 MMHG | RESPIRATION RATE: 16 BRPM | HEART RATE: 92 BPM | WEIGHT: 135 LBS | BODY MASS INDEX: 25.49 KG/M2 | OXYGEN SATURATION: 97 % | TEMPERATURE: 96.3 F

## 2021-10-07 DIAGNOSIS — R03.0 ELEVATED BLOOD-PRESSURE READING, W/OUT DIAGNOSIS OF HYPERTENSION: ICD-10-CM

## 2021-10-07 DIAGNOSIS — R93.89 ABNORMAL FINDINGS ON DIAGNOSTIC IMAGING OF OTHER SPECIFIED BODY STRUCTURES: ICD-10-CM

## 2021-10-07 DIAGNOSIS — R07.89 OTHER CHEST PAIN: ICD-10-CM

## 2021-10-07 DIAGNOSIS — Z87.898 PERSONAL HISTORY OF OTHER SPECIFIED CONDITIONS: ICD-10-CM

## 2021-10-07 DIAGNOSIS — R94.2 ABNORMAL RESULTS OF PULMONARY FUNCTION STUDIES: ICD-10-CM

## 2021-10-07 PROCEDURE — 99214 OFFICE O/P EST MOD 30 MIN: CPT

## 2021-10-07 NOTE — PROCEDURE
[FreeTextEntry1] : Full PFT revealed mild restrictive dysfunction, with a FEV1 of 1.48 L, which is 63% of predicted, mild decreased lung volumes, and a mild reduced diffusion of 12.4, which is 61% of predicted, with a normal flow volume loop. \par \par \par \par EXAM: CT ANGIO CHEST (W)AW IC \par \par \par PROCEDURE DATE: 05/15/2020 \par \par \par \par INTERPRETATION: CLINICAL INFORMATION: Chest pain, shortness of breath, \par assess PE. \par \par PROCEDURE: CT angiography of the chest was performed with intravenous \par contrast utilizing dedicated PE protocol. 75 mls of Omnipaque-350 \par administered without complication. 25 ml discarded. Coronal and sagittal \par reconstruction images were obtained. Axial MIP images were obtained from a \par separate workstation. \par \par COMPARISON: 5/2/2020. \par \par FINDINGS: Artifact from the spinal fixation and respiratory motion degrades \par images. \par \par LUNGS AND AIRWAYS: Patent central airways. No focal consolidation. \par Subsegmental bibasilar atelectasis. \par PLEURA: No pleural effusion or pneumothorax. \par HEART: Normal size. No pericardial effusion. \par VESSELS: Adequate contrast opacification of the pulmonary arterial trees. No \par obvious pulmonary embolus, given the extent of artifact. Normal caliber of \par the thoracic aorta. \par MEDIASTINUM AND TOÑO: No lymphadenopathy. \par CHEST WALL AND LOWER NECK: Again noted, nonspecific stranding in the \par visualized bilateral flank soft tissue. \par UPPER ABDOMEN: Difficult to assess due to artifact. Accessory right renal \par artery. \par BONES: Leftward curvature of the spine. Probable syndesmophyte along the \par residual spine. Stable mild anterior wedging of the spine. \par \par IMPRESSION: \par \par No obvious pulmonary embolus, given the extent of artifact. \par \par \par \par \par \par \par \par NAYELI DAVENPORT M.D., ATTENDING RADIOLOGIST \par This document has been electronically signed. May 15 2020 7:11AM

## 2021-10-07 NOTE — HISTORY OF PRESENT ILLNESS
[TextBox_4] : VISIT 8/11/2021:\par Ms. BELTRAN is a 41 year old female with a history of RA, scoliosis, childhood chickenpox, non smoker, GERD,  s/p pleural pericarditis 2020 and probable asthma who is in for follow up to discuss her PFT results and symptoms. \par \par Pt reports recently she has been dealing with high BP readings for her over the last 2 weeks. She checked her pressure due to feeling dizzy and little off. Her pressure has been in the 150's. She plans on speaking to her PCP about this next. No unusual stress in her life that she can think of. \par \par She is concerned that she has pulmonary fibrosis based on reading that she has done and would like to go through her work up again. \par \par She admits to severe GERD issues. She was in the ED multiple times for this. \par She is finally scheduled for esophageal manometry and barium swallow next for further evaluation. They did find gastritis and some reflux on recent testing but she notes feeling like food gets stuck in her esophagus or that the food does not move down. She is om omeprazole. She continues to have episodes of reflux/heartburn.\par \par Pulmonary wise- the chest tightness has improved for the most part since her last visit. \par She does note that she feels a fullness and heaviness in her chest on and off. She reports she has some intermittent cough as well. She reports that she does not feel short of breath but feels like she cant get a full breath in a lot of the time.\par She has not been using her Breo- she was unsure of the indication. \par \par She is back and forth between here and [place], plans on going back to [place] in October. \par She is on methotrexate weekly. \par She is also anemic- started on iron pills and reports her hgb has gotten up to 12+\par \par VISIT TODAY 10/7/2021:\par \par Ms. BELTRAN is a 41 year old female with a history of RA, scoliosis, childhood chickenpox, non smoker, GERD,  s/p pleural pericarditis 2020 and possible asthma here for routine follow up.\par \par Pt reports that she was compliant with her Breo x the full month- she felt it helped her and then as discussed, got off the inhaler to see the difference being off of it. She has been off for three weeks and has felt fine. She feels her breathing issues have lessened. \par \par She is only on nexium now feels she is doing well on that, \par her GI issues persist but she is waiting for results of her most recent testing. She went for another opinion and the GI MD felt she needed to wait for her stomach to settle down but the work up done was good enough.\par She is not on famotidine any longer. \par She feels with her stomach issues improving- her breathing symptoms also resolve. \par \par Her BP has improved- she wonders if it was from prednisone use (she had been on 5 mg along with her methotrexate).\par She is now off of it and her BP has seemed to normalize. \par \par She would like a definitive answer on whether or not she truly has asthma.\par She has never had an MCT. \par \par She plans to move to [place] soon but will complete all necessary health evaluations here. \par

## 2021-10-07 NOTE — REVIEW OF SYSTEMS
[GERD] : gerd [Arthralgias] : arthralgias [Cough] : no cough [Chest Tightness] : no chest tightness [Dyspnea] : no dyspnea [Wheezing] : no wheezing [SOB on Exertion] : no sob on exertion [Chest Discomfort] : no chest discomfort [Diarrhea] : no diarrhea [Constipation] : no constipation [Dysphagia] : no dysphagia [Dizziness] : no dizziness [Negative] : Respiratory [TextBox_30] : pulm symptoms have resolved

## 2021-10-07 NOTE — ASSESSMENT
[FreeTextEntry1] : Ms. BELTRAN is a 41 year old female with a history of RA, scoliosis, childhood chickenpox, non smoker, GERD,  s/p pleural pericarditis 2020 and probable asthma who is in for follow up  Her symptoms seem to be a combination of severe GERD, possible esophageal spasm vs swallowing dysfunction (?achalasia) with gastritis- since these have essentially improved- her chest symptoms have also resolved. b She is pending complete work up with GI. In addition to this, she has some restrictive lung disease but her CT scan was negative for pulmonary fibrosis or ILD. She may have had a poor test while completing PFTs? She also has a hx of methotrexate use.  \par \par The patient's shortness of breath is multifactorial due to:\par -pulmonary disease \par      -?Pleuropericarditis, ?asthma, ?ALYSSA/ Poor sleep, ?anemia\par -poor breathing mechanics \par -overweight/out of shape\par -?cardiac disease () ?pleuropericarditis (no evidence PE)\par \par Problem 1: Asthma symptoms- true asthma?\par -[facility] not offering MCT at pulm lab\par -can try for MCT through [facility]- we will send the order over to them \par -Hold Breo Ellipta 200 at 1 inhalation for now/see how she feels without\par \par Problem 2: severe under treated GERD\par -continue nexium QAM\par -per GI\par -Rule of 2s: avoid eating too much, eating too fast, eating too late, eating too spicy, eating too lousy, eating two hours before bed.\par -GI workup underway- consider achalasia?\par \par Problem 3: Anemia\par -on iron supplementation with + effect\par \par problem 4: RA hx with abnormal CT \par -Subsegmental bibasilar atelectasis. This is not concerning for pulmonary fibrosis however there is an association between rheum disorders and ILD. Continued surveillance needed; routine PFTs (pt is on methotrexate and at risk for methotrexate-induced lung injury- last CT without evidence of this)\par -can follow up with another CT 1 year from last\par \par problem 5: Elevated BP- resolving- steroid related?\par -document Blood pressures am and early evening as well as when symptomatic\par -follow up with PCP and notify them of BP readings vs see cardiologist\par -pt was advised to not ignore this. \par \par  Follow up post MCT or 3 months\par -he  is recommended to call with any changes, questions, or concerns.

## 2021-10-20 ENCOUNTER — TRANSCRIPTION ENCOUNTER (OUTPATIENT)
Age: 41
End: 2021-10-20

## 2021-10-25 ENCOUNTER — APPOINTMENT (OUTPATIENT)
Dept: VASCULAR SURGERY | Facility: CLINIC | Age: 41
End: 2021-10-25
Payer: COMMERCIAL

## 2021-10-25 ENCOUNTER — NON-APPOINTMENT (OUTPATIENT)
Age: 41
End: 2021-10-25

## 2021-10-25 VITALS
HEIGHT: 59 IN | HEART RATE: 98 BPM | DIASTOLIC BLOOD PRESSURE: 76 MMHG | BODY MASS INDEX: 26.81 KG/M2 | WEIGHT: 133 LBS | TEMPERATURE: 98 F | SYSTOLIC BLOOD PRESSURE: 134 MMHG

## 2021-10-25 PROCEDURE — 99202 OFFICE O/P NEW SF 15 MIN: CPT

## 2021-10-25 NOTE — HISTORY OF PRESENT ILLNESS
[FreeTextEntry1] : Mrs. Martin is a 41-year old lady who is being referred by Dr. Llanes with [facility] for evaluation for temporal artery biopsy to rule out temporal arteritis. \par \par She reports that approximately three weeks ago, she developed a headache, jaw pain, and left eye vision problems. She was evaluated at [facility], where per report a CT head/c-spine were negative for acute pathology; there is some spinal stenosis. A lower extremity venous duplex was negative for DVT. Echo was abnormal and she reports that she is scheduled for a stress test on 11/11/21. She was subsequently evaluated by neurology and received ?Lidocaine injection with resolution of symptoms. 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 She had elevated ESR (which she reports is normal for her in setting of RA) and is following up with her Rheumatologist this week. She was not placed on steroids.\par \par She denies any history of CVA, TIA, CAD, MI, DM, or CRI\par \par PMH: RA, GERD\par \par Meds: Methotrexate and Protonix\par \par All: NKDA\par \par SH: non-smoker\par \par FH: NC

## 2021-10-25 NOTE — PHYSICAL EXAM
[de-identified] : On physical examination the patient is in no acute distress and neurologically intact. The lungs are clear to auscultation and the heart has a regular rate and rhythm. Abdomen is benign. Bilateral upper and lower extremity pulses are present throughout and symmetric. No lower extremity edema present.

## 2021-10-25 NOTE — ASSESSMENT
[FreeTextEntry1] : In summary, Mrs. Martin presents for evaluation for temporal artery biopsy to rule out GCA. Per her Neurologists notes, they do not have a strong index of suspicion for GCA. She will follow up with her Rheumatologist and I have instructed her to have him contact me with his recommendations.

## 2021-11-19 NOTE — ED ADULT NURSE NOTE - NURSING NEURO ORIENTATION
[facility]     Hospitalist Progress Note     Jocelynn Garcia Patient Status:  Inpatient    10/12/1948 MRN [id number]   [facility] 6NE-A Attending Liset Landis MD   Hosp Day # 3 PCP Deepa Lilly DO     Chief Complaint: medical man 11/16/21  1729 11/17/21  1400 11/17/21  1452   PTP 14.3 17.7* 16.7*   INR 1.09 1.42* 1.32*            COVID-19 Lab Results    COVID-19  Lab Results   Component Value Date    COVID19 Not Detected 11/13/2021    COVID19 Not Detected 04/13/2021    COVID19 Not Pt recs home w/ 24 hr care   seroquel at HS  Daily lasix   WBC 27.7 lower  No fevers   Cont IS   Ambulate  tx to [id number] [address] of [facility] discussed with  RN, pt , left  for Jovanny Naylor w/ call back number        Supplementary Documentation:     Quality:  · DVT P oriented to person, place and time

## 2021-11-29 ENCOUNTER — TRANSCRIPTION ENCOUNTER (OUTPATIENT)
Age: 41
End: 2021-11-29

## 2021-12-01 ENCOUNTER — TRANSCRIPTION ENCOUNTER (OUTPATIENT)
Age: 41
End: 2021-12-01

## 2021-12-03 ENCOUNTER — TRANSCRIPTION ENCOUNTER (OUTPATIENT)
Age: 41
End: 2021-12-03

## 2021-12-13 ENCOUNTER — TRANSCRIPTION ENCOUNTER (OUTPATIENT)
Age: 41
End: 2021-12-13

## 2021-12-14 ENCOUNTER — TRANSCRIPTION ENCOUNTER (OUTPATIENT)
Age: 41
End: 2021-12-14

## 2022-01-31 ENCOUNTER — TRANSCRIPTION ENCOUNTER (OUTPATIENT)
Age: 42
End: 2022-01-31

## 2022-06-17 NOTE — ED ADULT NURSE NOTE - NSHOSCREENINGQ1_ED_ALL_ED
How Severe Are Your Spot(S)?: mild What Type Of Note Output Would You Prefer (Optional)?: Bullet Format What Is The Reason For Today's Visit?: Annual Full Body Skin Examination with No Concerns What Is The Reason For Today's Visit? (Being Monitored For X): concerning skin lesions on an annual basis No

## 2022-08-30 NOTE — ED PROVIDER NOTE - NEUROLOGICAL POSTURING
normal Mirvaso Pregnancy And Lactation Text: This medication has not been assigned a Pregnancy Risk Category. It is unknown if the medication is excreted in breast milk.

## 2023-05-19 NOTE — ED PROVIDER NOTE - NEUROLOGICAL, MLM
pt c/o chest pain throughout the day associated with "irregular heartbeat" and pre-syncopal episode
Alert and oriented, no focal deficits, no motor or sensory deficits.

## 2023-05-30 ENCOUNTER — RESULT REVIEW (OUTPATIENT)
Age: 43
End: 2023-05-30

## 2023-05-30 ENCOUNTER — OUTPATIENT (OUTPATIENT)
Dept: OUTPATIENT SERVICES | Facility: HOSPITAL | Age: 43
LOS: 1 days | End: 2023-05-30
Payer: COMMERCIAL

## 2023-05-30 ENCOUNTER — APPOINTMENT (OUTPATIENT)
Dept: PULMONOLOGY | Facility: CLINIC | Age: 43
End: 2023-05-30
Payer: COMMERCIAL

## 2023-05-30 ENCOUNTER — APPOINTMENT (OUTPATIENT)
Dept: RADIOLOGY | Facility: HOSPITAL | Age: 43
End: 2023-05-30
Payer: COMMERCIAL

## 2023-05-30 VITALS
HEART RATE: 84 BPM | BODY MASS INDEX: 27.82 KG/M2 | RESPIRATION RATE: 16 BRPM | TEMPERATURE: 97.1 F | SYSTOLIC BLOOD PRESSURE: 110 MMHG | DIASTOLIC BLOOD PRESSURE: 80 MMHG | WEIGHT: 138 LBS | HEIGHT: 59 IN | OXYGEN SATURATION: 98 %

## 2023-05-30 DIAGNOSIS — Z98.890 OTHER SPECIFIED POSTPROCEDURAL STATES: Chronic | ICD-10-CM

## 2023-05-30 DIAGNOSIS — R07.89 OTHER CHEST PAIN: ICD-10-CM

## 2023-05-30 DIAGNOSIS — R79.89 OTHER SPECIFIED ABNORMAL FINDINGS OF BLOOD CHEMISTRY: ICD-10-CM

## 2023-05-30 DIAGNOSIS — Z98.89 OTHER SPECIFIED POSTPROCEDURAL STATES: Chronic | ICD-10-CM

## 2023-05-30 DIAGNOSIS — K21.9 GASTRO-ESOPHAGEAL REFLUX DISEASE W/OUT ESOPHAGITIS: ICD-10-CM

## 2023-05-30 LAB — DEPRECATED D DIMER PPP IA-ACNC: 287 NG/ML DDU

## 2023-05-30 PROCEDURE — 94729 DIFFUSING CAPACITY: CPT

## 2023-05-30 PROCEDURE — 71046 X-RAY EXAM CHEST 2 VIEWS: CPT | Mod: 26

## 2023-05-30 PROCEDURE — 94727 GAS DIL/WSHOT DETER LNG VOL: CPT

## 2023-05-30 PROCEDURE — 99214 OFFICE O/P EST MOD 30 MIN: CPT | Mod: 25

## 2023-05-30 PROCEDURE — 71046 X-RAY EXAM CHEST 2 VIEWS: CPT

## 2023-05-30 PROCEDURE — 94618 PULMONARY STRESS TESTING: CPT

## 2023-05-30 PROCEDURE — 94010 BREATHING CAPACITY TEST: CPT

## 2023-05-30 RX ORDER — FAMOTIDINE 40 MG/1
40 TABLET, FILM COATED ORAL
Qty: 1 | Refills: 1 | Status: ACTIVE | COMMUNITY
Start: 2023-05-30 | End: 1900-01-01

## 2023-05-30 NOTE — PROCEDURE
[FreeTextEntry1] : Pulmonary testing performed in office today because of symptoms of chest discomfort. \par \par PFT's performed in office show minimal obstructive lung defect, mild restrictive lung defect, diffusion capacity is within normal limits.\par FEV1: 64% \par FEV1/FVC Ratio: 81\par WAK11-01%: 58% \par DLCO: 81% \par \par Feno: 15 ppb; WNL.\par \par 6MWT Performed in office WNL. \par RA SPO2 @ REST: 98% \par RA SPO2 on EXERTION: 93% \par

## 2023-05-30 NOTE — REVIEW OF SYSTEMS
[Wheezing] : wheezing [GERD] : gerd [Negative] : Endocrine [Cough] : no cough [Sputum] : no sputum [Dyspnea] : no dyspnea [SOB on Exertion] : no sob on exertion [TextBox_30] : "coolness in chest"

## 2023-05-30 NOTE — HISTORY OF PRESENT ILLNESS
[TextBox_4] : Ms. BELTRAN is a 43 year old female with a history of RA, scoliosis, childhood chickenpox, non smoker, GERD, s/p pleural pericarditis 2020, Covid-19 infection 1/2022 who presents to office for an acute care visit.\par \par Her chief concern is "cold feeling in my chest like him sucking on a halls" x 1 week. \par \par Patient states she was in [place] visiting her daughter last week.  She notes upon awakening on 5/22 she felt a breathless sensation which she describes as a "cool feeling in her chest as though she is sucking on something mint flavored".  She states given this feeling in her chest as well as a bad headache she used the pulse oximeter in her daughter's home and states it was low. She presented to ED in [place] for evaluation. She states ED was concerned for stroke so they did a CT Head that resulted WNL. She was told her headache was likely r/t her blood pressure being elevated. She states no chest imaging performed when in ED. She states she is scheduled for Echo tomorrow, but the was planned prior to recent ED visit as she needs it for clearance for upcoming colonoscopy. \par \par She admits to awakening with phlegm in the morning.\par She complains of "light whistle" of intermittent wheezing.\par \par She states prior to ED visit, she recalls experiencing an episode of dizziness on exertion with running upstairs. \par She states she walks frequently without pulmonary complaints.  \par She states no issues with today's 6MWT or PFTs. \par \par She states she has had similar "coolness in chest" in the past and it was her GERD.  She admits to recent sour taste in mouth.  She states in the past when this symptom would occur she would start famotidine, but she has yet to initiate antireflux medication.\par \par She denies fever/chills, shortness of breath at rest or exertion, cough, or chest tightness.

## 2023-05-30 NOTE — ASSESSMENT
[FreeTextEntry1] : Ms. BELTRAN is a 43 year old female with a history of RA, scoliosis, childhood chickenpox, non smoker, GERD, s/p pleural pericarditis 2020, Covid-19 infection 1/2022 who presents to office for an acute care visit. Her chief concern is "cold feeling in my chest like him sucking on a halls" x 1 week. \par \par 1. Chest Discomfort:\par – 6-minute walk test within normal limits.  Pulmonary function test performed today compared to 2021 and it appears that this is patient's baseline.\par – Most Likely related to reflux; Rx for famotidine 40 mg to be initiated nightly.\par - RX for CXR for further evaluation.  List of image centers provided at checkout.\par – less likely PE; Rx for D-dimer in place for further evaluation.  List of lab locations provided at checkout.\par – Patient advised to follow-up with cardiology.\par \par Patient to follow up if no improvement. \par Patient to call with further questions and concerns.\par Patient verbalizes understanding of care plan and is agreeable.\par

## 2023-05-30 NOTE — DISCUSSION/SUMMARY
[FreeTextEntry1] : - 6/21/2021 PFT's performed: \par FEV1: 63% \par FEV1/FVC Ratio: 84\par HPT41-79%: 64% \par DLCO: 61% \par \par - 12/7/2021 MCT NEGATIVE; performed at [facility] (see results in system).\par \par

## 2023-06-03 ENCOUNTER — APPOINTMENT (OUTPATIENT)
Dept: CT IMAGING | Facility: CLINIC | Age: 43
End: 2023-06-03
Payer: COMMERCIAL

## 2023-06-03 ENCOUNTER — OUTPATIENT (OUTPATIENT)
Dept: OUTPATIENT SERVICES | Facility: HOSPITAL | Age: 43
LOS: 1 days | End: 2023-06-03
Payer: COMMERCIAL

## 2023-06-03 DIAGNOSIS — R07.89 OTHER CHEST PAIN: ICD-10-CM

## 2023-06-03 DIAGNOSIS — Z98.89 OTHER SPECIFIED POSTPROCEDURAL STATES: Chronic | ICD-10-CM

## 2023-06-03 DIAGNOSIS — Z00.8 ENCOUNTER FOR OTHER GENERAL EXAMINATION: ICD-10-CM

## 2023-06-03 DIAGNOSIS — R79.89 OTHER SPECIFIED ABNORMAL FINDINGS OF BLOOD CHEMISTRY: ICD-10-CM

## 2023-06-03 DIAGNOSIS — Z98.890 OTHER SPECIFIED POSTPROCEDURAL STATES: Chronic | ICD-10-CM

## 2023-06-03 PROCEDURE — 71275 CT ANGIOGRAPHY CHEST: CPT | Mod: 26

## 2023-06-03 PROCEDURE — 71275 CT ANGIOGRAPHY CHEST: CPT

## 2023-06-07 ENCOUNTER — TRANSCRIPTION ENCOUNTER (OUTPATIENT)
Age: 43
End: 2023-06-07

## 2023-11-08 NOTE — ED ADULT NURSE NOTE - NS ED NURSE RECORD ANOTHER HT AND WT
PT C/O CONTINUED COUGH, SORE THROAT, FATIGUE AND BODY ACHES. PT STATES SHE WAS SEEN LAST WEEK FOR SIMILAR SYMPTOMS AND IS NOT BETTER. Yes

## 2024-01-19 NOTE — ED PROVIDER NOTE - PHYSICAL EXAMINATION
English Gen: Awake, NAD, speaking in complete sentences  Head: NC, AT, EOMI, normal lids/conjunctiva  ENT: normal hearing, patent oropharynx  Neck: supple, no tenderness/meningismus, FROM, Trachea midline  Pulm: deferred, COVID PUI  CV: RRR, +dist pulses  Abd: soft, ND, no guarding/rebound tenderness  Mskel: no edema/erythema/cyanosis  Skin: no rash  Neuro: AAOx3, no sensory/motor deficits

## 2024-02-27 NOTE — ED ADULT TRIAGE NOTE - NSTRIAGECARE_GEN_A_ER
Face Mask/EKG 54-year-old female with history of hypertension presenting to the ED with complaints of cough congestion and tactile fever chills for 3 days, patient reports no associated nausea vomiting diarrhea denies any chest pain or shortness of breath, denies any other complaints.  No reported sick contacts.  Reports had recent flu and COVID test which was negative.

## 2024-05-29 NOTE — ED ADULT TRIAGE NOTE - NSTRIAGECARE_GEN_A_ER
Please call to schedule HTN follow up for early to mid July, 2024 per refill protocol.  Then will refill up to appointment date.     Face Mask

## 2024-07-05 NOTE — ED PROVIDER NOTE - ATTESTATION, MLM
125 I have reviewed and confirmed nurses' notes for patient's medications, allergies, medical history, and surgical history.

## 2024-07-06 NOTE — ED PROVIDER NOTE - CONDITION AT DISCHARGE:
Physical Exam  T(C): 37.1 (11-26-17 @ 16:13), Max: 37.1 (11-26-17 @ 11:48)  HR: 70 (11-26-17 @ 17:35) (59 - 70)  BP: 169/58 (11-26-17 @ 17:35) (155/78 - 198/61)  RR: 16 (11-26-17 @ 16:13) (14 - 18)  SpO2: 100% (11-26-17 @ 16:13) (99% - 100%)  Tmax: T(C): , Max: 37.1 (11-26-17 @ 11:48)      Gen: NAD  HEENT: normocephalic, atraumatic, no scleral icterus  CV: S1, S2, RRR  Pulm: CTA B/L  Abd: Soft, ND, NTP, no rebound, no guarding, pulsatility noted in upper abd. Well-healed L thoracotomy incision.  Ext: warm, no edema, palp DP/PT
Assistance OOB with selected safe patient handling equipment/Communicate Risk of Fall with Harm to all staff/Discuss with provider need for PT consult/Monitor gait and stability/Provide patient with walking aids - walker, cane, crutches/Reinforce activity limits and safety measures with patient and family/Tailored Fall Risk Interventions/Visual Cue: Yellow wristband and red socks/Bed in lowest position, wheels locked, appropriate side rails in place/Call bell, personal items and telephone in reach/Instruct patient to call for assistance before getting out of bed or chair/Non-slip footwear when patient is out of bed/[place] to call system/Physically safe environment - no spills, clutter or unnecessary equipment/Purposeful Proactive Rounding/Room/bathroom lighting operational, light cord in reach
Satisfactory

## 2025-04-30 NOTE — ED ADULT NURSE NOTE - NS ED NURSE LEVEL OF CONSCIOUSNESS ORIENTATION
Temples.../Clavicles.../Shoulders.../Calf.../Dorsal hand...
Oriented - self; Oriented - place; Oriented - time

## 2025-06-02 NOTE — ED ADULT NURSE NOTE - TEMPLATE LIST FOR HEAD TO TOE ASSESSMENT
Neuro [NI] : Normal [de-identified] : Bilateral breasts s/p bilateral breast reduction, incisions well healed with good post op symmetry and shape. No collections or signs of infection.  Nipple areolar complexes viable and sensate.